# Patient Record
Sex: FEMALE | Race: WHITE | NOT HISPANIC OR LATINO | Employment: STUDENT | URBAN - METROPOLITAN AREA
[De-identification: names, ages, dates, MRNs, and addresses within clinical notes are randomized per-mention and may not be internally consistent; named-entity substitution may affect disease eponyms.]

---
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
## 2022-07-07 ENCOUNTER — TELEPHONE (OUTPATIENT)
Dept: PEDIATRICS | Age: 22
End: 2022-07-07

## 2022-07-19 ENCOUNTER — OFFICE VISIT (OUTPATIENT)
Dept: PEDIATRICS | Age: 22
End: 2022-07-19

## 2022-07-19 VITALS
OXYGEN SATURATION: 99 % | DIASTOLIC BLOOD PRESSURE: 71 MMHG | HEIGHT: 65 IN | BODY MASS INDEX: 19.76 KG/M2 | TEMPERATURE: 98.3 F | WEIGHT: 118.6 LBS | HEART RATE: 88 BPM | SYSTOLIC BLOOD PRESSURE: 108 MMHG

## 2022-07-19 DIAGNOSIS — Z00.129 WELL ADOLESCENT VISIT WITHOUT ABNORMAL FINDINGS: Primary | ICD-10-CM

## 2022-07-19 LAB
CHOLEST SERPL-MCNC: 128 MG/DL
GLUCOSE SERPL-MCNC: 111 MG/DL
HDLC SERPL-MCNC: 42 MG/DL
LDLC SERPL CALC-MCNC: 72 MG/DL (ref ?–109)
NON HDL CHOL: 86
TRIGLYCERIDES: 71

## 2022-07-19 PROCEDURE — 82465 ASSAY BLD/SERUM CHOLESTEROL: CPT | Performed by: PEDIATRICS

## 2022-07-19 PROCEDURE — 90471 IMMUNIZATION ADMIN: CPT | Performed by: PEDIATRICS

## 2022-07-19 PROCEDURE — 96127 BRIEF EMOTIONAL/BEHAV ASSMT: CPT | Performed by: PEDIATRICS

## 2022-07-19 PROCEDURE — 99395 PREV VISIT EST AGE 18-39: CPT | Performed by: PEDIATRICS

## 2022-07-19 PROCEDURE — 90715 TDAP VACCINE 7 YRS/> IM: CPT | Performed by: PEDIATRICS

## 2022-07-19 ASSESSMENT — PATIENT HEALTH QUESTIONNAIRE - PHQ9
1. LITTLE INTEREST OR PLEASURE IN DOING THINGS: NOT AT ALL
SUM OF ALL RESPONSES TO PHQ9 QUESTIONS 1 AND 2: 0
CLINICAL INTERPRETATION OF PHQ2 SCORE: NO FURTHER SCREENING NEEDED
2. FEELING DOWN, DEPRESSED OR HOPELESS: NOT AT ALL
SUM OF ALL RESPONSES TO PHQ9 QUESTIONS 1 AND 2: 0

## 2023-04-18 ASSESSMENT — PATIENT HEALTH QUESTIONNAIRE - PHQ9
SUM OF ALL RESPONSES TO PHQ9 QUESTIONS 1 AND 2: 0
SUM OF ALL RESPONSES TO PHQ9 QUESTIONS 1 AND 2: 0
CLINICAL INTERPRETATION OF PHQ2 SCORE: NO FURTHER SCREENING NEEDED
2. FEELING DOWN, DEPRESSED OR HOPELESS: NOT AT ALL
1. LITTLE INTEREST OR PLEASURE IN DOING THINGS: NOT AT ALL

## 2023-08-21 ENCOUNTER — APPOINTMENT (OUTPATIENT)
Dept: OTHER | Facility: HOSPITAL | Age: 23
End: 2023-08-21
Attending: PREVENTIVE MEDICINE

## 2023-08-23 ENCOUNTER — APPOINTMENT (OUTPATIENT)
Dept: OTHER | Facility: HOSPITAL | Age: 23
End: 2023-08-23
Attending: PREVENTIVE MEDICINE

## 2025-04-08 ENCOUNTER — APPOINTMENT (OUTPATIENT)
Dept: CT IMAGING | Facility: HOSPITAL | Age: 25
End: 2025-04-08
Attending: EMERGENCY MEDICINE
Payer: COMMERCIAL

## 2025-04-08 ENCOUNTER — APPOINTMENT (OUTPATIENT)
Dept: CT IMAGING | Facility: HOSPITAL | Age: 25
End: 2025-04-08
Attending: STUDENT IN AN ORGANIZED HEALTH CARE EDUCATION/TRAINING PROGRAM
Payer: COMMERCIAL

## 2025-04-08 ENCOUNTER — HOSPITAL ENCOUNTER (INPATIENT)
Facility: HOSPITAL | Age: 25
LOS: 5 days | Discharge: HOME | End: 2025-04-13
Attending: EMERGENCY MEDICINE | Admitting: STUDENT IN AN ORGANIZED HEALTH CARE EDUCATION/TRAINING PROGRAM
Payer: COMMERCIAL

## 2025-04-08 ENCOUNTER — ANESTHESIA (OUTPATIENT)
Dept: SURGERY | Facility: HOSPITAL | Age: 25
End: 2025-04-08
Payer: COMMERCIAL

## 2025-04-08 ENCOUNTER — APPOINTMENT (OUTPATIENT)
Dept: CT IMAGING | Facility: HOSPITAL | Age: 25
End: 2025-04-08
Attending: NURSE PRACTITIONER
Payer: COMMERCIAL

## 2025-04-08 ENCOUNTER — ANESTHESIA EVENT (OUTPATIENT)
Dept: SURGERY | Facility: HOSPITAL | Age: 25
End: 2025-04-08
Payer: COMMERCIAL

## 2025-04-08 ENCOUNTER — APPOINTMENT (OUTPATIENT)
Dept: ULTRASOUND IMAGING | Facility: HOSPITAL | Age: 25
End: 2025-04-08
Attending: NEUROLOGICAL SURGERY
Payer: COMMERCIAL

## 2025-04-08 DIAGNOSIS — S06.4XAA EPIDURAL HEMATOMA (HCC): ICD-10-CM

## 2025-04-08 DIAGNOSIS — S06.5XAA ACUTE SUBDURAL HEMATOMA (HCC): Primary | ICD-10-CM

## 2025-04-08 DIAGNOSIS — S02.91XA CLOSED DEPRESSED FRACTURE OF SKULL, INITIAL ENCOUNTER (HCC): ICD-10-CM

## 2025-04-08 DIAGNOSIS — R41.82 ALTERED MENTAL STATUS, UNSPECIFIED ALTERED MENTAL STATUS TYPE: ICD-10-CM

## 2025-04-08 LAB
ALBUMIN SERPL-MCNC: 4.6 G/DL (ref 3.2–4.8)
ALBUMIN/GLOB SERPL: 1.8 {RATIO} (ref 1–2)
ALP LIVER SERPL-CCNC: 59 U/L
ALT SERPL-CCNC: 10 U/L
ANION GAP SERPL CALC-SCNC: 9 MMOL/L (ref 0–18)
ANION GAP SERPL CALC-SCNC: 9 MMOL/L (ref 0–18)
ANTIBODY SCREEN: NEGATIVE
APTT PPP: 21.9 SECONDS (ref 23–36)
AST SERPL-CCNC: 24 U/L (ref ?–34)
BASOPHILS # BLD AUTO: 0.04 X10(3) UL (ref 0–0.2)
BASOPHILS # BLD AUTO: 0.09 X10(3) UL (ref 0–0.2)
BASOPHILS NFR BLD AUTO: 0.2 %
BASOPHILS NFR BLD AUTO: 0.7 %
BILIRUB SERPL-MCNC: 0.3 MG/DL (ref 0.3–1.2)
BUN BLD-MCNC: 13 MG/DL (ref 9–23)
BUN BLD-MCNC: 15 MG/DL (ref 9–23)
CALCIUM BLD-MCNC: 8.2 MG/DL (ref 8.7–10.6)
CALCIUM BLD-MCNC: 9.5 MG/DL (ref 8.7–10.6)
CHLORIDE SERPL-SCNC: 106 MMOL/L (ref 98–112)
CHLORIDE SERPL-SCNC: 109 MMOL/L (ref 98–112)
CO2 SERPL-SCNC: 21 MMOL/L (ref 21–32)
CO2 SERPL-SCNC: 25 MMOL/L (ref 21–32)
CREAT BLD-MCNC: 0.84 MG/DL
CREAT BLD-MCNC: 1.04 MG/DL
EGFRCR SERPLBLD CKD-EPI 2021: 101 ML/MIN/1.73M2 (ref 60–?)
EGFRCR SERPLBLD CKD-EPI 2021: 78 ML/MIN/1.73M2 (ref 60–?)
EOSINOPHIL # BLD AUTO: 0 X10(3) UL (ref 0–0.7)
EOSINOPHIL # BLD AUTO: 0.43 X10(3) UL (ref 0–0.7)
EOSINOPHIL NFR BLD AUTO: 0 %
EOSINOPHIL NFR BLD AUTO: 3.4 %
ERYTHROCYTE [DISTWIDTH] IN BLOOD BY AUTOMATED COUNT: 11 %
ERYTHROCYTE [DISTWIDTH] IN BLOOD BY AUTOMATED COUNT: 11.2 %
GLOBULIN PLAS-MCNC: 2.5 G/DL (ref 2–3.5)
GLUCOSE BLD-MCNC: 132 MG/DL (ref 70–99)
GLUCOSE BLD-MCNC: 142 MG/DL (ref 70–99)
GLUCOSE BLD-MCNC: 150 MG/DL (ref 70–99)
HCT VFR BLD AUTO: 32.2 %
HCT VFR BLD AUTO: 37 %
HGB BLD-MCNC: 11.2 G/DL
HGB BLD-MCNC: 12.6 G/DL
IMM GRANULOCYTES # BLD AUTO: 0.07 X10(3) UL (ref 0–1)
IMM GRANULOCYTES # BLD AUTO: 0.15 X10(3) UL (ref 0–1)
IMM GRANULOCYTES NFR BLD: 0.6 %
IMM GRANULOCYTES NFR BLD: 0.7 %
INR BLD: 1.05 (ref 0.8–1.2)
LYMPHOCYTES # BLD AUTO: 1.02 X10(3) UL (ref 1–4)
LYMPHOCYTES # BLD AUTO: 3.97 X10(3) UL (ref 1–4)
LYMPHOCYTES NFR BLD AUTO: 31.5 %
LYMPHOCYTES NFR BLD AUTO: 4.5 %
MCH RBC QN AUTO: 32 PG (ref 26–34)
MCH RBC QN AUTO: 32.2 PG (ref 26–34)
MCHC RBC AUTO-ENTMCNC: 34.1 G/DL (ref 31–37)
MCHC RBC AUTO-ENTMCNC: 34.8 G/DL (ref 31–37)
MCV RBC AUTO: 92.5 FL
MCV RBC AUTO: 93.9 FL
MONOCYTES # BLD AUTO: 0.59 X10(3) UL (ref 0.1–1)
MONOCYTES # BLD AUTO: 0.76 X10(3) UL (ref 0.1–1)
MONOCYTES NFR BLD AUTO: 2.6 %
MONOCYTES NFR BLD AUTO: 6 %
NEUTROPHILS # BLD AUTO: 20.88 X10 (3) UL (ref 1.5–7.7)
NEUTROPHILS # BLD AUTO: 20.88 X10(3) UL (ref 1.5–7.7)
NEUTROPHILS # BLD AUTO: 7.3 X10 (3) UL (ref 1.5–7.7)
NEUTROPHILS # BLD AUTO: 7.3 X10(3) UL (ref 1.5–7.7)
NEUTROPHILS NFR BLD AUTO: 57.8 %
NEUTROPHILS NFR BLD AUTO: 92 %
OSMOLALITY SERPL CALC.SUM OF ELEC: 291 MOSM/KG (ref 275–295)
OSMOLALITY SERPL CALC.SUM OF ELEC: 293 MOSM/KG (ref 275–295)
PLATELET # BLD AUTO: 209 10(3)UL (ref 150–450)
PLATELET # BLD AUTO: 259 10(3)UL (ref 150–450)
POTASSIUM SERPL-SCNC: 3.3 MMOL/L (ref 3.5–5.1)
POTASSIUM SERPL-SCNC: 4.3 MMOL/L (ref 3.5–5.1)
PROT SERPL-MCNC: 7.1 G/DL (ref 5.7–8.2)
PROTHROMBIN TIME: 13.8 SECONDS (ref 11.6–14.8)
RBC # BLD AUTO: 3.48 X10(6)UL
RBC # BLD AUTO: 3.94 X10(6)UL
RH BLOOD TYPE: NEGATIVE
SODIUM SERPL-SCNC: 139 MMOL/L (ref 136–145)
SODIUM SERPL-SCNC: 140 MMOL/L (ref 136–145)
WBC # BLD AUTO: 12.6 X10(3) UL (ref 4–11)
WBC # BLD AUTO: 22.7 X10(3) UL (ref 4–11)

## 2025-04-08 PROCEDURE — 99223 1ST HOSP IP/OBS HIGH 75: CPT | Performed by: HOSPITALIST

## 2025-04-08 PROCEDURE — 70496 CT ANGIOGRAPHY HEAD: CPT | Performed by: NURSE PRACTITIONER

## 2025-04-08 PROCEDURE — 70486 CT MAXILLOFACIAL W/O DYE: CPT | Performed by: STUDENT IN AN ORGANIZED HEALTH CARE EDUCATION/TRAINING PROGRAM

## 2025-04-08 PROCEDURE — 99291 CRITICAL CARE FIRST HOUR: CPT | Performed by: NURSE PRACTITIONER

## 2025-04-08 PROCEDURE — 70498 CT ANGIOGRAPHY NECK: CPT | Performed by: NURSE PRACTITIONER

## 2025-04-08 PROCEDURE — 00C30ZZ EXTIRPATION OF MATTER FROM INTRACRANIAL EPIDURAL SPACE, OPEN APPROACH: ICD-10-PCS | Performed by: NEUROLOGICAL SURGERY

## 2025-04-08 PROCEDURE — 8E09XBZ COMPUTER ASSISTED PROCEDURE OF HEAD AND NECK REGION: ICD-10-PCS | Performed by: NEUROLOGICAL SURGERY

## 2025-04-08 PROCEDURE — 70450 CT HEAD/BRAIN W/O DYE: CPT | Performed by: EMERGENCY MEDICINE

## 2025-04-08 PROCEDURE — 72125 CT NECK SPINE W/O DYE: CPT | Performed by: EMERGENCY MEDICINE

## 2025-04-08 PROCEDURE — 76998 US GUIDE INTRAOP: CPT | Performed by: NEUROLOGICAL SURGERY

## 2025-04-08 PROCEDURE — 0NS004Z REPOSITION SKULL WITH INTERNAL FIXATION DEVICE, OPEN APPROACH: ICD-10-PCS | Performed by: NEUROLOGICAL SURGERY

## 2025-04-08 DEVICE — STRAIGHT PLATE, 12MM BAR, WITH TAB
Type: IMPLANTABLE DEVICE | Site: HEAD | Status: FUNCTIONAL
Brand: UNIVERSAL NEURO 3

## 2025-04-08 DEVICE — STRAIGHT PLATE
Type: IMPLANTABLE DEVICE | Site: HEAD | Status: FUNCTIONAL
Brand: UNIVERSAL NEURO 3

## 2025-04-08 DEVICE — BURR HOLE COVER, WITH TAB, 10MM
Type: IMPLANTABLE DEVICE | Site: HEAD | Status: FUNCTIONAL
Brand: UNIVERSAL NEURO 3

## 2025-04-08 RX ORDER — SENNOSIDES 8.6 MG
17.2 TABLET ORAL NIGHTLY PRN
Status: DISCONTINUED | OUTPATIENT
Start: 2025-04-08 | End: 2025-04-13

## 2025-04-08 RX ORDER — ACETAMINOPHEN 650 MG/1
650 SUPPOSITORY RECTAL EVERY 4 HOURS PRN
Status: DISCONTINUED | OUTPATIENT
Start: 2025-04-08 | End: 2025-04-13

## 2025-04-08 RX ORDER — POLYETHYLENE GLYCOL 3350 17 G/17G
17 POWDER, FOR SOLUTION ORAL DAILY PRN
Status: DISCONTINUED | OUTPATIENT
Start: 2025-04-08 | End: 2025-04-13

## 2025-04-08 RX ORDER — SODIUM CHLORIDE 9 MG/ML
INJECTION, SOLUTION INTRAVENOUS CONTINUOUS
Status: DISCONTINUED | OUTPATIENT
Start: 2025-04-08 | End: 2025-04-08

## 2025-04-08 RX ORDER — OXYCODONE HYDROCHLORIDE 5 MG/1
5 TABLET ORAL EVERY 4 HOURS PRN
Status: DISCONTINUED | OUTPATIENT
Start: 2025-04-08 | End: 2025-04-13

## 2025-04-08 RX ORDER — DEXAMETHASONE SODIUM PHOSPHATE 4 MG/ML
VIAL (ML) INJECTION AS NEEDED
Status: DISCONTINUED | OUTPATIENT
Start: 2025-04-08 | End: 2025-04-08 | Stop reason: SURG

## 2025-04-08 RX ORDER — LEVETIRACETAM 500 MG/5ML
500 INJECTION, SOLUTION, CONCENTRATE INTRAVENOUS ONCE
Status: COMPLETED | OUTPATIENT
Start: 2025-04-08 | End: 2025-04-08

## 2025-04-08 RX ORDER — PROCHLORPERAZINE EDISYLATE 5 MG/ML
5 INJECTION INTRAMUSCULAR; INTRAVENOUS EVERY 8 HOURS PRN
Status: DISCONTINUED | OUTPATIENT
Start: 2025-04-08 | End: 2025-04-08

## 2025-04-08 RX ORDER — HYDROCODONE BITARTRATE AND ACETAMINOPHEN 5; 325 MG/1; MG/1
1 TABLET ORAL ONCE AS NEEDED
Status: DISCONTINUED | OUTPATIENT
Start: 2025-04-08 | End: 2025-04-08

## 2025-04-08 RX ORDER — ROCURONIUM BROMIDE 10 MG/ML
INJECTION, SOLUTION INTRAVENOUS AS NEEDED
Status: DISCONTINUED | OUTPATIENT
Start: 2025-04-08 | End: 2025-04-08 | Stop reason: SURG

## 2025-04-08 RX ORDER — BISACODYL 10 MG
10 SUPPOSITORY, RECTAL RECTAL
Status: DISCONTINUED | OUTPATIENT
Start: 2025-04-08 | End: 2025-04-13

## 2025-04-08 RX ORDER — ACETAMINOPHEN 500 MG
1000 TABLET ORAL ONCE AS NEEDED
Status: DISCONTINUED | OUTPATIENT
Start: 2025-04-08 | End: 2025-04-08

## 2025-04-08 RX ORDER — LABETALOL HYDROCHLORIDE 5 MG/ML
10 INJECTION, SOLUTION INTRAVENOUS EVERY 10 MIN PRN
Status: DISCONTINUED | OUTPATIENT
Start: 2025-04-08 | End: 2025-04-13

## 2025-04-08 RX ORDER — HYDRALAZINE HYDROCHLORIDE 20 MG/ML
10 INJECTION INTRAMUSCULAR; INTRAVENOUS EVERY 2 HOUR PRN
Status: DISCONTINUED | OUTPATIENT
Start: 2025-04-08 | End: 2025-04-13

## 2025-04-08 RX ORDER — SODIUM CHLORIDE 9 MG/ML
INJECTION, SOLUTION INTRAVENOUS CONTINUOUS
Status: DISCONTINUED | OUTPATIENT
Start: 2025-04-08 | End: 2025-04-13

## 2025-04-08 RX ORDER — ONDANSETRON 2 MG/ML
4 INJECTION INTRAMUSCULAR; INTRAVENOUS EVERY 6 HOURS PRN
Status: DISCONTINUED | OUTPATIENT
Start: 2025-04-08 | End: 2025-04-13

## 2025-04-08 RX ORDER — SODIUM CHLORIDE 9 MG/ML
INJECTION, SOLUTION INTRAVENOUS CONTINUOUS PRN
Status: DISCONTINUED | OUTPATIENT
Start: 2025-04-08 | End: 2025-04-08 | Stop reason: SURG

## 2025-04-08 RX ORDER — LEVETIRACETAM 500 MG/5ML
500 INJECTION, SOLUTION, CONCENTRATE INTRAVENOUS EVERY 12 HOURS
Status: DISCONTINUED | OUTPATIENT
Start: 2025-04-08 | End: 2025-04-08

## 2025-04-08 RX ORDER — ONDANSETRON 2 MG/ML
INJECTION INTRAMUSCULAR; INTRAVENOUS
Status: COMPLETED
Start: 2025-04-08 | End: 2025-04-08

## 2025-04-08 RX ORDER — ACETAMINOPHEN 325 MG/1
650 TABLET ORAL EVERY 4 HOURS PRN
Status: DISCONTINUED | OUTPATIENT
Start: 2025-04-08 | End: 2025-04-13

## 2025-04-08 RX ORDER — SODIUM PHOSPHATE, DIBASIC AND SODIUM PHOSPHATE, MONOBASIC 7; 19 G/230ML; G/230ML
1 ENEMA RECTAL ONCE AS NEEDED
Status: DISCONTINUED | OUTPATIENT
Start: 2025-04-08 | End: 2025-04-13

## 2025-04-08 RX ORDER — MEPERIDINE HYDROCHLORIDE 25 MG/ML
12.5 INJECTION INTRAMUSCULAR; INTRAVENOUS; SUBCUTANEOUS AS NEEDED
Status: DISCONTINUED | OUTPATIENT
Start: 2025-04-08 | End: 2025-04-09

## 2025-04-08 RX ORDER — ONDANSETRON 2 MG/ML
4 INJECTION INTRAMUSCULAR; INTRAVENOUS EVERY 6 HOURS PRN
Status: DISCONTINUED | OUTPATIENT
Start: 2025-04-08 | End: 2025-04-08

## 2025-04-08 RX ORDER — LIDOCAINE HYDROCHLORIDE AND EPINEPHRINE 10; 10 MG/ML; UG/ML
INJECTION, SOLUTION INFILTRATION; PERINEURAL AS NEEDED
Status: DISCONTINUED | OUTPATIENT
Start: 2025-04-08 | End: 2025-04-08 | Stop reason: HOSPADM

## 2025-04-08 RX ORDER — HYDROMORPHONE HYDROCHLORIDE 1 MG/ML
0.6 INJECTION, SOLUTION INTRAMUSCULAR; INTRAVENOUS; SUBCUTANEOUS EVERY 5 MIN PRN
Status: DISCONTINUED | OUTPATIENT
Start: 2025-04-08 | End: 2025-04-09

## 2025-04-08 RX ORDER — HYDROMORPHONE HYDROCHLORIDE 1 MG/ML
0.4 INJECTION, SOLUTION INTRAMUSCULAR; INTRAVENOUS; SUBCUTANEOUS EVERY 5 MIN PRN
Status: DISCONTINUED | OUTPATIENT
Start: 2025-04-08 | End: 2025-04-09

## 2025-04-08 RX ORDER — SODIUM CHLORIDE, SODIUM LACTATE, POTASSIUM CHLORIDE, CALCIUM CHLORIDE 600; 310; 30; 20 MG/100ML; MG/100ML; MG/100ML; MG/100ML
INJECTION, SOLUTION INTRAVENOUS CONTINUOUS
Status: DISCONTINUED | OUTPATIENT
Start: 2025-04-08 | End: 2025-04-08

## 2025-04-08 RX ORDER — LIDOCAINE HYDROCHLORIDE 10 MG/ML
INJECTION, SOLUTION EPIDURAL; INFILTRATION; INTRACAUDAL; PERINEURAL AS NEEDED
Status: DISCONTINUED | OUTPATIENT
Start: 2025-04-08 | End: 2025-04-08 | Stop reason: SURG

## 2025-04-08 RX ORDER — LABETALOL HYDROCHLORIDE 5 MG/ML
5 INJECTION, SOLUTION INTRAVENOUS EVERY 5 MIN PRN
Status: DISCONTINUED | OUTPATIENT
Start: 2025-04-08 | End: 2025-04-08

## 2025-04-08 RX ORDER — HYDROMORPHONE HYDROCHLORIDE 1 MG/ML
0.2 INJECTION, SOLUTION INTRAMUSCULAR; INTRAVENOUS; SUBCUTANEOUS EVERY 5 MIN PRN
Status: DISCONTINUED | OUTPATIENT
Start: 2025-04-08 | End: 2025-04-09

## 2025-04-08 RX ORDER — PANTOPRAZOLE SODIUM 40 MG/1
40 TABLET, DELAYED RELEASE ORAL
Status: DISCONTINUED | OUTPATIENT
Start: 2025-04-09 | End: 2025-04-13

## 2025-04-08 RX ORDER — HYDROCODONE BITARTRATE AND ACETAMINOPHEN 5; 325 MG/1; MG/1
2 TABLET ORAL ONCE AS NEEDED
Status: DISCONTINUED | OUTPATIENT
Start: 2025-04-08 | End: 2025-04-08

## 2025-04-08 RX ORDER — ESMOLOL HYDROCHLORIDE 10 MG/ML
INJECTION INTRAVENOUS AS NEEDED
Status: DISCONTINUED | OUTPATIENT
Start: 2025-04-08 | End: 2025-04-08 | Stop reason: SURG

## 2025-04-08 RX ORDER — NORGESTIMATE AND ETHINYL ESTRADIOL 7DAYSX3 28
KIT ORAL
COMMUNITY

## 2025-04-08 RX ORDER — NALOXONE HYDROCHLORIDE 0.4 MG/ML
0.08 INJECTION, SOLUTION INTRAMUSCULAR; INTRAVENOUS; SUBCUTANEOUS AS NEEDED
Status: DISCONTINUED | OUTPATIENT
Start: 2025-04-08 | End: 2025-04-09

## 2025-04-08 RX ORDER — ONDANSETRON 2 MG/ML
4 INJECTION INTRAMUSCULAR; INTRAVENOUS ONCE
Status: COMPLETED | OUTPATIENT
Start: 2025-04-08 | End: 2025-04-08

## 2025-04-08 RX ORDER — METOCLOPRAMIDE HYDROCHLORIDE 5 MG/ML
10 INJECTION INTRAMUSCULAR; INTRAVENOUS EVERY 8 HOURS PRN
Status: DISCONTINUED | OUTPATIENT
Start: 2025-04-08 | End: 2025-04-13

## 2025-04-08 RX ORDER — LEVETIRACETAM 500 MG/5ML
500 INJECTION, SOLUTION, CONCENTRATE INTRAVENOUS EVERY 12 HOURS
Status: DISCONTINUED | OUTPATIENT
Start: 2025-04-09 | End: 2025-04-09

## 2025-04-08 RX ORDER — CEFAZOLIN SODIUM 1 G/3ML
INJECTION, POWDER, FOR SOLUTION INTRAMUSCULAR; INTRAVENOUS AS NEEDED
Status: DISCONTINUED | OUTPATIENT
Start: 2025-04-08 | End: 2025-04-08 | Stop reason: SURG

## 2025-04-08 RX ADMIN — LEVETIRACETAM 1000 MG: 500 INJECTION, SOLUTION, CONCENTRATE INTRAVENOUS at 18:51:00

## 2025-04-08 RX ADMIN — ROCURONIUM BROMIDE 100 MG: 10 INJECTION, SOLUTION INTRAVENOUS at 18:25:00

## 2025-04-08 RX ADMIN — SODIUM CHLORIDE: 9 INJECTION, SOLUTION INTRAVENOUS at 18:40:00

## 2025-04-08 RX ADMIN — SODIUM CHLORIDE: 9 INJECTION, SOLUTION INTRAVENOUS at 21:01:00

## 2025-04-08 RX ADMIN — DEXAMETHASONE SODIUM PHOSPHATE 10 MG: 4 MG/ML VIAL (ML) INJECTION at 18:54:00

## 2025-04-08 RX ADMIN — CEFAZOLIN SODIUM 2 G: 1 INJECTION, POWDER, FOR SOLUTION INTRAMUSCULAR; INTRAVENOUS at 18:52:00

## 2025-04-08 RX ADMIN — SODIUM CHLORIDE: 9 INJECTION, SOLUTION INTRAVENOUS at 18:16:00

## 2025-04-08 RX ADMIN — ESMOLOL HYDROCHLORIDE 50 MG: 10 INJECTION INTRAVENOUS at 18:21:00

## 2025-04-08 RX ADMIN — LIDOCAINE HYDROCHLORIDE 25 MG: 10 INJECTION, SOLUTION EPIDURAL; INFILTRATION; INTRACAUDAL; PERINEURAL at 18:25:00

## 2025-04-08 NOTE — ED INITIAL ASSESSMENT (HPI)
Pt was playing softball, collided with another player and was struck in the head with a cleet.  Unknown LOC.  Per EMS pt A&Ox2, vomited several times PTA.  C-collar prior to admission by EMS.

## 2025-04-08 NOTE — ED QUICK NOTES
Patient to OR with RT, two Rns and ER tech, along with family. Keppra given to OR anesthesiology.

## 2025-04-08 NOTE — H&P
[facility]     Adina Jefferson Patient Status:  Emergency    10/2/2000 MRN [id number]   Location [facility] EMERGENCY DEPARTMENT Attending Oren Bedoya MD   Hosp Day # 0 PCP No primary care provider on file.     Chief Complaint: Head trauma    Subjective:    History of Present Illness:     Adina Jefferson is a 24 year old female with past medical history of anxiety and depression presents emergency room after playing a game of softball where the patient collided with another player and was struck in the head with a cleat.  Patient was arousable slow to respond.  She does not know who her parents are aware she is at or what happened.  She does not know her last name and does not understand simple objects.  She does know her first name and her date of birth.  No fevers, chills, diarrhea.  No loss of bladder or bowel function.  No back pain, abdominal pain, chest pain.    History/Other:    Past Medical History:  Past Medical History:    Anxiety     Past Surgical History:   History reviewed. No pertinent surgical history.   Family History:   No family history on file.  Social History:    reports that she has never smoked. She has never used smokeless tobacco.     Allergies: Allergies[1]    Medications:  Medications Ordered Prior to Encounter[2]    Review of Systems:   A comprehensive review of systems was completed.    Pertinent positives and negatives noted in the HPI.    Objective:   Physical Exam:    BP (!) 136/97   Pulse 93   Temp 98 °F (36.7 °C) (Temporal)   Resp 16   Ht 5' 7\" (1.702 m)   Wt 130 lb (59 kg)   LMP  (LMP Unknown)   SpO2 100%   BMI 20.36 kg/m²   General: No acute distress, Alert  Respiratory: No rhonchi, no wheezes  Cardiovascular: S1, S2. Regular rate and rhythm  Abdomen: Soft, Non-tender, non-distended, positive bowel sounds  Neuro: No new focal deficits  Extremities: No edema      Results:    Labs:      Labs Last 24 Hours:    No results for input(s): \"RBC\",  \"HGB\", \"HCT\", \"MCV\", \"MCH\", \"MCHC\", \"RDW\", \"NEPRELIM\", \"WBC\", \"PLT\" in the last 168 hours.    No results for input(s): \"GLU\", \"BUN\", \"CREATSERUM\", \"GFRAA\", \"GFRNAA\", \"EGFRCR\", \"CA\", \"ALB\", \"NA\", \"K\", \"CL\", \"CO2\", \"ALKPHO\", \"AST\", \"ALT\", \"BILT\", \"TP\" in the last 168 hours.    eGFR cannot be calculated (No successful lab value found.).    Lab Results   Component Value Date    INR 1.05 04/08/2025       No results for input(s): \"TROP\", \"TROPHS\", \"CK\" in the last 168 hours.    No results for input(s): \"TROP\", \"PBNP\" in the last 168 hours.    No results for input(s): \"PCT\" in the last 168 hours.    Imaging: Imaging data reviewed in Epic.    Assessment & Plan:      # Multiple skull fracture including left temporal and bilateral greater sphenoid wings, left middle cranial fossa, sphemoid sinuses, sella, and carotid canals, bilateral temporomandibular joints.  - neurosurgery and NCC on consult.    # Left lateral cerebral hemorrhage  - Will admit to NICU  - Neurosurgery on consult  - NCC on consult  - Liely going to the OR tonight with worsening  lethergy. Pt to go for a left craniotomy for evacuation of epidural hematoma.      Plan of care discussed with ER physician.    Nick Wood, DO    Supplementary Documentation:     The 21st Century Cures Act makes medical notes like these available to patients in the interest of transparency. Please be advised this is a medical document. Medical documents are intended to carry relevant information, facts as evident, and the clinical opinion of the practitioner. The medical note is intended as peer to peer communication and may appear blunt or direct. It is written in medical language and may contain abbreviations or verbiage that are unfamiliar.                                     [1] No Known Allergies  [2]   No current facility-administered medications on file prior to encounter.     Current Outpatient Medications on File Prior to Encounter   Medication Sig Dispense  Refill    sertraline 50 MG Oral Tab Take 1.5 tablets (75 mg total) by mouth daily.      Norgestim-Eth Estrad Triphasic 0.18/0.215/0.25 MG-35 MCG Oral Tab 1 tablet Orally Once a day for 84 days

## 2025-04-08 NOTE — SPIRITUAL CARE NOTE
Spiritual Care Visit Note    Patient Name: Adina Jefferson Date of Spiritual Care Visit: 25   : 10/2/2000 Primary Dx: Acute subdural hematoma (HCC)       Referred By: Referral From: Nurse    Spiritual Care Taxonomy:    Intended Effects: Convey a calming presence    Methods: Offer support    Interventions: Acknowledge current situation    Visit Type/Summary:     - Spiritual Care: Responded to a request via the on call phone Offered empathic listening and emotional support. However family opted to be by themselves at this time. Respected their space/time. Provided information regarding how to contact Spiritual Care and handed over a Spiritual Care contact information/resources.  remains available as needed for follow up.    Spiritual Care support can be requested via an Epic consult.   For urgent/immediate needs, please contact the On Call  at: Mendez: ext [phone]    Rev. Mendez Huffman M.Div., M.T.S.,   Certified Grief Counseling Specialist  Advanced Practice Board Certified

## 2025-04-08 NOTE — ED QUICK NOTES
Patient vomiting at bedside. Zofran ordered and given. Patient O2 at 88% on RA, patient placed on 2L NC, SPO2 at 100%. Patient is arousable, slow to respond. Doesn't know who her parents are, where she is at or what happened, doesn't understand simple objects, doesn't know her last name. Does know her name and . C-Collar in place. Will continue to monitor.

## 2025-04-08 NOTE — ANESTHESIA PREPROCEDURE EVALUATION
PRE-OP EVALUATION    Patient Name: Adina Jefferson    Admit Diagnosis: No admission diagnoses are documented for this encounter.    Pre-op Diagnosis: Epidural hematoma (HCC) [S06.4XAA]    LEFT CRANIOTOMY FOR EVACUATION OF EPIDURAL HEMATOMA    Anesthesia Procedure: LEFT CRANIOTOMY FOR EVACUATION OF EPIDURAL HEMATOMA (Left)    Surgeons and Role:     * Xiang Herrera MD - Primary    Pre-op vitals reviewed.  Temp: 98 °F (36.7 °C)  Pulse: 93  Resp: 16  BP: 136/97  SpO2: 100 %  Body mass index is 20.36 kg/m².    Current medications reviewed.  Hospital Medications:   [COMPLETED] ondansetron (Zofran) 4 MG/2ML injection 4 mg  4 mg Intravenous Once       Outpatient Medications:   Prescriptions Prior to Admission[1]    Allergies: Patient has no known allergies.      Anesthesia Evaluation  Patient Active Problem List   Diagnosis    Acute subdural hematoma (HCC)        Past Medical History:    Anxiety          History reviewed. No pertinent surgical history.  Social History     Socioeconomic History    Marital status: Single   Tobacco Use    Smoking status: Never    Smokeless tobacco: Never     History   Drug Use Not on file     Available pre-op labs reviewed.               Airway  Comment: Multiple facial fractures on CT, face puffy on exam, C-collar in place, pt obtunded and intermittently following directions          Neck ROM: limited Cardiovascular    Cardiovascular exam normal.  Rhythm: regular  Rate: normal     Dental             Pulmonary    Pulmonary exam normal.  Breath sounds clear to auscultation bilaterally.               Other findings              ASA: 5 and emergent  Plan: general   Patient does not meet NPO guidelines.    Post-procedure pain management plan discussed with surgeon and patient.    Comment:  I explained intrinsic risks of general anesthesia, including nausea, dental damage, sore throat, mouth injury,and hoarseness from airway management.  All questions were answered and understanding was  demonstrated of risks.  Informed permission was obtained to proceed as documented in the signed consent form.        Consent obtained from father given obtunded patient.  Plan/risks discussed with: patient, father and mother  Use of blood product(s) discussed with: patient, father and mother    Consented to blood products.          Present on Admission:  **None**             [1] (Not in a hospital admission)

## 2025-04-08 NOTE — ED PROVIDER NOTES
Patient Seen in: [facility] [place] Emergency Department      History     Chief Complaint   Patient presents with    Trauma 1 & 2    Head Neck Injury     Stated Complaint:     Subjective:   HPI      24-year-old female brought in by EMS after a head injury while playing softball at the local [employer].  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  It is unknown if she lost consciousness at the scene but she has not been acting like her normal self.  Injury occurred just prior to arrival.  Patient reportedly vomited at the scene.  Patient does not remember the incident and is currently unable to provide any significant history.    Objective:     Past Medical History:    Anxiety              History reviewed. No pertinent surgical history.             Social History     Socioeconomic History    Marital status: Single   Tobacco Use    Smoking status: Never    Smokeless tobacco: Never                  Physical Exam     ED Triage Vitals [04/08/25 1616]   /80   Pulse 70   Resp 10   Temp 98 °F (36.7 °C)   Temp src Temporal   SpO2 100 %   O2 Device None (Room air)       Current Vitals:   Vital Signs  BP: 150/88  Pulse: 111  Resp: 17  Temp: 98 °F (36.7 °C)  Temp src: Temporal  MAP (mmHg): (!) 109    Oxygen Therapy  SpO2: 95 %  O2 Device: Nasal cannula  O2 Flow Rate (L/min): 2 L/min        Physical Exam  General:  Vitals as listed.    HEENT: Soft contusion to the left temporal area.  No laceration.  No Vazquez sign.  No hemotympanums.  There is some dried blood in bilateral nasal passages.  No tenderness on palpation of the bridge of the nose.  No obvious nose deformity or septal hematoma.  Neck: C-collar in place.  Denying bony tenderness on palpation  Lungs: good air exchange and clear   Heart: regular rate rhythm and no murmur   Abdomen: Soft and nontender.  No peritoneal signs   Extremities: No obvious traumatic injury to the  extremities.  No edema  Neuro: Awake and following some commands.  Responding to some questioning but reliability of answers is uncertain.  She is slow to answer and certainly appears altered  Skin: no rashes or nodules    ED Course     Labs Reviewed   PTT, ACTIVATED - Abnormal; Notable for the following components:       Result Value    PTT 21.9 (*)     All other components within normal limits   PROTHROMBIN TIME (PT) - Normal   CBC WITH DIFFERENTIAL WITH PLATELET   COMP METABOLIC PANEL (14)   CBC WITH DIFFERENTIAL WITH PLATELET   TYPE AND SCREEN    Narrative:     The following orders were created for panel order Type and screen.  Procedure                               Abnormality         Status                     ---------                               -----------         ------                     ABORH (Blood Type)[[id number]]                               In process                 Antibody Screen[[id number]]                                  In process                   Please view results for these tests on the individual orders.   ABORH (BLOOD TYPE)   ANTIBODY SCREEN   RAINBOW DRAW LAVENDER   RAINBOW DRAW LIGHT GREEN   RAINBOW DRAW BLUE   RAINBOW DRAW GOLD            CT BRAIN OR HEAD (CPT=70450)    Result Date: 4/8/2025  PROCEDURE:  CT BRAIN OR HEAD (23672)  COMPARISON:  None.  INDICATIONS:  head injury/pain, poor historian  TECHNIQUE:  Noncontrast CT scanning is performed through the brain. Dose reduction techniques were used. Dose information is transmitted to the ACR (American College of Radiology) NRDR (National Radiology Data Registry) which includes the Dose Index Registry.  PATIENT STATED HISTORY: (As transcribed by Technologist)  Patient collided with another player at softball, cleat to left side of head. Vomiting x4. C-Collar placed. Poor historian.   FINDINGS: Comminuted left temporal bone fracture noted with depression of multiple fragments.  Fracture planes also extend into the bilateral greater  sphenoid wings, floor of the left middle cranial fossa, sphenoid sinuses, sella, bilateral carotid canals, bilateral temporomandibular joints. Soft tissue laceration in the adjacent left temporal scalp along with soft tissue swelling.  There is an extra-axial hemorrhage along the lateral left cerebral convexity, most pronounced along the left temporal lobe as well as the floor of the left middle cranial fossa measuring up to 10 mm in thickness.  This is most likely in the epidural space.  There is mild local mass effect.  No hydrocephalus.  There is no significant midline shift.  The basal cisterns are patent.  The gray-white matter differentiation is intact.  Air-fluid levels in the sphenoid sinuses.            CONCLUSION:   1. Comminuted left temporal bone fracture noted with depression of multiple fragments.  Fracture planes also extend into the bilateral greater sphenoid wings, floor of the left middle cranial fossa, sphenoid sinuses, sella, bilateral carotid canals, bilateral temporomandibular joints. Soft tissue laceration in the adjacent left temporal scalp along with soft tissue swelling.  2. There is an extra-axial hemorrhage along the lateral left cerebral convexity, most pronounced along the left temporal lobe as well as the floor of the left middle cranial fossa measuring up to 10 mm in thickness.  This is most likely in the epidural space.  There is mild local mass effect.  Critical results were discussed with Dr. Bedoya at 1648 hours on 4/8/2025. Critical results were read back.   LOCATION:  [facility]   Dictated by (CST): Hermelindo Taylor MD on 4/08/2025 at 4:42 PM     Finalized by (CST): Hermelindo Taylor MD on 4/08/2025 at 4:51 PM       CT SPINE CERVICAL (CPT=72125)    Result Date: 4/8/2025  PROCEDURE:  CT SPINE CERVICAL (CPT=72125)  COMPARISON:  None.  INDICATIONS:  head trauma, poor historian, pain  TECHNIQUE:  Noncontrast CT scanning of the cervical spine is performed from the skull base through C7.   Multiplanar reconstructions are generated.  Dose reduction techniques were used. Dose information is transmitted to the ACR (American College of Radiology) NRDR (National Radiology Data Registry) which includes the Dose Index Registry.  PATIENT STATED HISTORY: (As transcribed by Technologist)  Patient collided with another player at [employer], cleat to left side of head. Vomiting x4. C-Collar placed. Poor historian.    FINDINGS:  There is slight straightening of the normal cervical lordosis.  This may be positional.  No significant spondylolisthesis is present.  Vertebral bodies and intervertebral disc spaces appear overall maintained in height.  There is no evidence of acute fracture of the cervical spine.  No significant bony central canal or neural foraminal stenosis is seen.  The paravertebral soft tissues are unremarkable in appearance.            CONCLUSION:  No evidence of acute fracture of the cervical spine.  No significant bony central canal or neural foraminal stenosis is seen.  If there is persistent clinical concern then recommend follow-up imaging with MRI.    LOCATION:  [id number]   Dictated by (CST): Quoc Zimmerman MD on 4/08/2025 at 4:42 PM     Finalized by (CST): Quoc Zimmerman MD on 4/08/2025 at 4:45 PM             MDM      24-year-old female presents altered after head injury.  Visible contusion and tenderness to the left temporal skull region    Additional history obtained by mom who reports that she did not see the collision but was at the game and was told that the patient vomited in the outfield after the injury    Differential includes but is not limited to skull fracture, intracranial hemorrhage, C-spine injury, concussion, contusions, a life/function threat.    Trauma labs ordered for further evaluation.    My independent interpretation of CT of the head is that there are visible fractures to the left skull area.  Fracture appears depressed.  Discussed imaging with radiology who reports numerous skull  fractures with subdural and epidural hemorrhage.    Reviewed imaging findings with Dr. Herrera from neurosurgery who evaluated the patient at the bedside and is actively taking her to the OR.  Patient continues to control her airway at this time.  The case was discussed with trauma surgery as well as neurointensive care.  Admitted to the [facility].        Critical care time:  A total of 60 minutes of critical care time (exclusive of billable procedures) was administered to manage the patient's critical imaging findings and neurologic instability due to her multiple skull fractures with subdural hematoma, epidural hematoma, altered mental status.  This involved direct patient intervention, complex decision making, and/or extensive discussions with the patient, family, and clinical staff.      Admission disposition: 4/8/2025  5:42 PM           Medical Decision Making      Disposition and Plan     Clinical Impression:  1. Acute subdural hematoma (HCC)    2. Epidural hematoma (HCC)    3. Closed depressed fracture of skull, initial encounter ([facility])    4. Altered mental status, unspecified altered mental status type         Disposition:  Admit  4/8/2025  5:42 pm    Follow-up:  No follow-up provider specified.        Medications Prescribed:  Current Discharge Medication List              Supplementary Documentation:         Hospital Problems       Present on Admission             ICD-10-CM Noted POA    * (Principal) Acute subdural hematoma (HCC) S06.5XAA 4/8/2025 Unknown    Altered mental status, unspecified altered mental status type R41.82 4/8/2025 Unknown    Closed depressed fracture of skull, initial encounter (HCC) S02.91XA 4/8/2025 Unknown    Epidural hematoma (HCC) S06.4XAA 4/8/2025 Unknown

## 2025-04-08 NOTE — CONSULTS
[facility]   part of [facility]    Neurosurgery Consult  2025    Adina Jefferson Patient Status:  Emergency    10/2/2000 MRN [id number]   Location [facility] SURGERY Attending Xiang Herrera,*   Hosp Day # 0 PCP No primary care provider on file.     REASON FOR CONSULT:    Epidural hematoma, skull fracture, altered mental status    HISTORY OF PRESENT ILLNESS:  Adina Jefferson is a(n) 24 year old female who was brought into the emergency department by EMS.  She is a college .  According to her parents, she was sliding into a base.  Her have been made contact with part of another player's body, most likely the foot.    The patient reportedly lost consciousness immediately, then quickly regained.  She was confused but speaking at the scene.    At this point, the patient is not able to provide any historical elements.    REVIEW OF SYSTEMS:  The 12 point checklist was reviewed and was negative except: As noted in HPI    ALLERGIES:  Allergies[1]    MEDICATIONS:  Prescriptions Prior to Admission[2]    HISTORY:  Past Medical History:    Anxiety     History reviewed. No pertinent surgical history.  No family history on file.  Adina  reports that she has never smoked. She has never used smokeless tobacco.    PHYSICAL EXAMINATION:  Vital Signs:  /88   Pulse 111   Temp 98 °F (36.7 °C) (Temporal)   Resp 17   Ht 67\"   Wt 130 lb (59 kg)   LMP  (LMP Unknown)   SpO2 95%   BMI 20.36 kg/m²     On examination, the patient is intermittently drowsy, then awake.  When drowsy, she is arousable to voice and light tactile stimulation.  She is profoundly dysphasic.  She will repeat words intermittently, but is not oriented to person, place, year, or the situation.  She moves all 4 extremities well.  No pronator drift.  She is able to follow commands in all 4 extremities.    REVIEW OF STUDIES:  CT head reviewed.  This demonstrates a comminuted depressed left temporal skull fracture.  There is  an underlying epidural hematoma with some mass effect on the brain.      ASSESSMENT AND PLAN:  1.  Epidural hematoma with altered mental status    2.  Depressed skull fracture    I had a conversation with her parents regarding her clinical condition.  Given the decline in her speech from the scene of the accident to present, as well as the findings on the CT scan which I reviewed with him, I recommended emergent surgical intervention.  This will involve left craniotomy versus craniectomy with elevation of depressed skull fracture and evacuation of subdural hematoma.    Risks and benefits of the operation were reviewed.  Risks of the operation include, but are not limited to, anesthetic complications, pain, death, paralysis, seizures, infection, need for more surgery, etc.    With that said, the risks of continued observation now outweigh the risks of an operation.  They are agreeable to proceed.    Total critical care time 45 minutes, including direct patient care, imaging and clinical review, and coordination of care with nursing and other services.  The patient has a life-threatening brain injury, and requires immediate stabilization and surgical intervention.          4/8/2025  6:26 PM      This report was dictated using voice recognition technology.  Errors in syntax or recognition may occur, and should not be construed to change the meaning of a sentence.          [1] No Known Allergies  [2]   Medications Prior to Admission   Medication Sig Dispense Refill Last Dose/Taking    sertraline 50 MG Oral Tab Take 1.5 tablets (75 mg total) by mouth daily.   Past Week    Norgestim-Eth Estrad Triphasic 0.18/0.215/0.25 MG-35 MCG Oral Tab 1 tablet Orally Once a day for 84 days   Past Week

## 2025-04-09 PROBLEM — D72.829 LEUKOCYTOSIS: Status: ACTIVE | Noted: 2025-04-09

## 2025-04-09 PROBLEM — S06.351A: Status: ACTIVE | Noted: 2025-04-09

## 2025-04-09 PROBLEM — S06.5XAA ACUTE SUBDURAL HEMATOMA (HCC): Status: RESOLVED | Noted: 2025-04-08 | Resolved: 2025-04-09

## 2025-04-09 LAB
ANION GAP SERPL CALC-SCNC: 8 MMOL/L (ref 0–18)
BASE EXCESS BLD CALC-SCNC: -9 MMOL/L
BASOPHILS # BLD AUTO: 0.02 X10(3) UL (ref 0–0.2)
BASOPHILS NFR BLD AUTO: 0.1 %
BUN BLD-MCNC: 11 MG/DL (ref 9–23)
CA-I BLD-SCNC: 1.02 MMOL/L (ref 1.12–1.32)
CALCIUM BLD-MCNC: 7.7 MG/DL (ref 8.7–10.6)
CHLORIDE SERPL-SCNC: 109 MMOL/L (ref 98–112)
CO2 BLD-SCNC: 17 MMOL/L (ref 22–32)
CO2 SERPL-SCNC: 24 MMOL/L (ref 21–32)
CREAT BLD-MCNC: 0.76 MG/DL (ref 0.55–1.02)
EGFRCR SERPLBLD CKD-EPI 2021: 114 ML/MIN/1.73M2 (ref 60–?)
EOSINOPHIL # BLD AUTO: 0 X10(3) UL (ref 0–0.7)
EOSINOPHIL NFR BLD AUTO: 0 %
ERYTHROCYTE [DISTWIDTH] IN BLOOD BY AUTOMATED COUNT: 11 %
GLUCOSE BLD-MCNC: 108 MG/DL (ref 70–99)
GLUCOSE BLD-MCNC: 118 MG/DL (ref 70–99)
GLUCOSE BLD-MCNC: 124 MG/DL (ref 70–99)
GLUCOSE BLD-MCNC: 127 MG/DL (ref 70–99)
GLUCOSE BLD-MCNC: 130 MG/DL (ref 70–99)
GLUCOSE BLD-MCNC: 148 MG/DL (ref 70–99)
HCO3 BLD-SCNC: 16.4 MEQ/L
HCT VFR BLD AUTO: 31.1 % (ref 35–48)
HCT VFR BLD CALC: 26 % (ref 34–50)
HGB BLD-MCNC: 10.4 G/DL (ref 12–16)
IMM GRANULOCYTES # BLD AUTO: 0.07 X10(3) UL (ref 0–1)
IMM GRANULOCYTES NFR BLD: 0.4 %
LYMPHOCYTES # BLD AUTO: 0.66 X10(3) UL (ref 1–4)
LYMPHOCYTES NFR BLD AUTO: 4 %
MCH RBC QN AUTO: 31.7 PG (ref 26–34)
MCHC RBC AUTO-ENTMCNC: 33.4 G/DL (ref 31–37)
MCV RBC AUTO: 94.8 FL (ref 80–100)
MONOCYTES # BLD AUTO: 0.38 X10(3) UL (ref 0.1–1)
MONOCYTES NFR BLD AUTO: 2.3 %
MRSA DNA SPEC QL NAA+PROBE: NEGATIVE
NEUTROPHILS # BLD AUTO: 15.17 X10 (3) UL (ref 1.5–7.7)
NEUTROPHILS # BLD AUTO: 15.17 X10(3) UL (ref 1.5–7.7)
NEUTROPHILS NFR BLD AUTO: 93.2 %
OSMOLALITY SERPL CALC.SUM OF ELEC: 293 MOSM/KG (ref 275–295)
PCO2 BLD: 27 MMHG
PH BLD: 7.39 [PH]
PLATELET # BLD AUTO: 182 10(3)UL (ref 150–450)
PO2 BLD: 458 MMHG
POTASSIUM BLD-SCNC: 3.6 MMOL/L (ref 3.6–5.1)
POTASSIUM SERPL-SCNC: 4.3 MMOL/L (ref 3.5–5.1)
RBC # BLD AUTO: 3.28 X10(6)UL (ref 3.8–5.3)
SAO2 % BLD: 100 %
SODIUM BLD-SCNC: 140 MMOL/L (ref 136–145)
SODIUM SERPL-SCNC: 141 MMOL/L (ref 136–145)
WBC # BLD AUTO: 16.3 X10(3) UL (ref 4–11)

## 2025-04-09 PROCEDURE — 99231 SBSQ HOSP IP/OBS SF/LOW 25: CPT

## 2025-04-09 PROCEDURE — 99291 CRITICAL CARE FIRST HOUR: CPT | Performed by: INTERNAL MEDICINE

## 2025-04-09 PROCEDURE — 99233 SBSQ HOSP IP/OBS HIGH 50: CPT | Performed by: HOSPITALIST

## 2025-04-09 RX ORDER — LEVETIRACETAM 500 MG/1
500 TABLET ORAL 2 TIMES DAILY
Status: DISCONTINUED | OUTPATIENT
Start: 2025-04-09 | End: 2025-04-13

## 2025-04-09 RX ORDER — HYDROMORPHONE HYDROCHLORIDE 1 MG/ML
0.4 INJECTION, SOLUTION INTRAMUSCULAR; INTRAVENOUS; SUBCUTANEOUS EVERY 2 HOUR PRN
Refills: 0 | Status: DISCONTINUED | OUTPATIENT
Start: 2025-04-09 | End: 2025-04-13

## 2025-04-09 RX ORDER — BUTALBITAL, ACETAMINOPHEN AND CAFFEINE 50; 325; 40 MG/1; MG/1; MG/1
1 TABLET ORAL EVERY 4 HOURS PRN
Status: DISCONTINUED | OUTPATIENT
Start: 2025-04-09 | End: 2025-04-13

## 2025-04-09 RX ORDER — HYDROMORPHONE HYDROCHLORIDE 1 MG/ML
0.8 INJECTION, SOLUTION INTRAMUSCULAR; INTRAVENOUS; SUBCUTANEOUS EVERY 2 HOUR PRN
Refills: 0 | Status: DISCONTINUED | OUTPATIENT
Start: 2025-04-09 | End: 2025-04-13

## 2025-04-09 RX ORDER — HYDROMORPHONE HYDROCHLORIDE 1 MG/ML
0.2 INJECTION, SOLUTION INTRAMUSCULAR; INTRAVENOUS; SUBCUTANEOUS EVERY 2 HOUR PRN
Refills: 0 | Status: DISCONTINUED | OUTPATIENT
Start: 2025-04-09 | End: 2025-04-13

## 2025-04-09 NOTE — ANESTHESIA PROCEDURE NOTES
Arterial Line    Performed by: Jb Luna MD  Authorized by: Jb Luna MD    General Information and Staff    Procedure Start:   Procedure End:  4/8/2025 6:33 PM  Anesthesiologist:  Jb Luna MD  Performed By:  Anesthesiologist  Patient Location:  OR  Indication: continuous blood pressure monitoring    Site Identification: real time ultrasound guided and image stored and retrievable    Preanesthetic Checklist: 2 patient identifiers, IV checked, risks and benefits discussed, monitors and equipment checked, pre-op evaluation, timeout performed, anesthesia consent and sterile technique used    Procedure Details    Catheter Size:  20 G  Catheter Length:  1 and 3/4 inch  Catheter Type:  Angiocath  Seldinger Technique?: No    Laterality:  Left  Site:  Radial artery  Site Prep: alcohol swabs    Line Secured:  Tape and Tegaderm    Assessment    Events: patient tolerated procedure well with no complications      Medications      Additional Comments

## 2025-04-09 NOTE — ANESTHESIA POSTPROCEDURE EVALUATION
[facility]    Adina Jefferson Patient Status:  Inpatient   Age/Gender 24 year old female MRN [id number]   Location [facility] 6NE-A Attending Xiang Herrera,*   Hosp Day # 0 PCP No primary care provider on file.       Anesthesia Post-op Note    LEFT CRANIOTOMY FOR EVACUATION OF EPIDURAL HEMATOMA, ELEVATION OF DEPRESSED SKULL FRACTURE    Procedure Summary       Date: 04/08/25 Room / Location:  MAIN OR [address] [place] [address] MAIN OR    Anesthesia Start: 1816 Anesthesia Stop: 2109    Procedure: LEFT CRANIOTOMY FOR EVACUATION OF EPIDURAL HEMATOMA, ELEVATION OF DEPRESSED SKULL FRACTURE (Left: Head) Diagnosis:       Epidural hematoma (HCC)      (Epidural hematoma (HCC) [S06.4XAA])    Surgeons: Xiang Herrera MD Anesthesiologist: Jb Luna MD    Anesthesia Type: general ASA Status: 5 - Emergent            Anesthesia Type: No value filed.    Vitals Value Taken Time   /75 04/08/25 2145   Temp 97.3 °F (36.3 °C) 04/08/25 2104   Pulse 69 04/08/25 2145   Resp 18 04/08/25 2145   SpO2 98 % 04/08/25 2145   Vitals shown include unfiled device data.        Patient Location: ICU    Anesthesia Type: general    Airway Patency: patent    Postop Pain Control: adequate    Mental Status: preanesthetic baseline    Nausea/Vomiting: none    Cardiopulmonary/Hydration status: stable euvolemic    Complications: no apparent anesthesia related complications    Postop vital signs: stable    Dental Exam: Unchanged from Preop    Patient to be discharged from PACU when criteria met.

## 2025-04-09 NOTE — PAYOR COMM NOTE
ADMISSION REVIEW     Payor: [facility]  Subscriber #:  [id number]  Authorization Number: [id number]    Admit date: 4/8/25  Admit time:  9:01 PM       REVIEW DOCUMENTATION:     ED Provider Notes        ED Provider Notes signed by Oren Bedoya MD at 4/8/2025  6:28 PM      History     Chief Complaint   Patient presents with    Trauma 1 & 2    Head Neck Injury     Stated Complaint:     Subjective:   HPI      24-year-old female brought in by EMS after a head injury while playing softball at the local [employer].  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  It is unknown if she lost consciousness at the scene but she has not been acting like her normal self.  Injury occurred just prior to arrival.  Patient reportedly vomited at the scene.  Patient does not remember the incident and is currently unable to provide any significant history.    Objective:     Past Medical History:    Anxiety     ED Triage Vitals [04/08/25 1616]   /80   Pulse 70   Resp 10   Temp 98 °F (36.7 °C)   Temp src Temporal   SpO2 100 %   O2 Device None (Room air)       Current Vitals:   Vital Signs  BP: 150/88  Pulse: 111  Resp: 17  Temp: 98 °F (36.7 °C)  Temp src: Temporal  MAP (mmHg): (!) 109    Oxygen Therapy  SpO2: 95 %  O2 Device: Nasal cannula  O2 Flow Rate (L/min): 2 L/min        Physical Exam  General:  Vitals as listed.    HEENT: Soft contusion to the left temporal area.  No laceration.  No Vazquez sign.  No hemotympanums.  There is some dried blood in bilateral nasal passages.  No tenderness on palpation of the bridge of the nose.  No obvious nose deformity or septal hematoma.  Neck: C-collar in place.  Denying bony tenderness on palpation  Lungs: good air exchange and clear   Heart: regular rate rhythm and no murmur   Abdomen: Soft and nontender.  No peritoneal signs   Extremities: No obvious traumatic injury to the extremities.  No  edema  Neuro: Awake and following some commands.  Responding to some questioning but reliability of answers is uncertain.  She is slow to answer and certainly appears altered  Skin: no rashes or nodules    ED Course     Labs Reviewed   PTT, ACTIVATED - Abnormal; Notable for the following components:       Result Value    PTT 21.9 (*)     All other components within normal limits   PROTHROMBIN TIME (PT) - Normal   CBC WITH DIFFERENTIAL WITH PLATELET   COMP METABOLIC PANEL (14)   CBC WITH DIFFERENTIAL WITH PLATELET   TYPE AND SCREEN    Narrative:     The following orders were created for panel order Type and screen.  Procedure                               Abnormality         Status                     ---------                               -----------         ------                     ABORH (Blood Type)[[id number]]                               In process                 Antibody Screen[[id number]]                                  In process                   Please view results for these tests on the individual orders.   ABORH (BLOOD TYPE)   ANTIBODY SCREEN   RAINBOW DRAW LAVENDER   RAINBOW DRAW LIGHT GREEN   RAINBOW DRAW BLUE   RAINBOW DRAW GOLD     CT BRAIN OR HEAD (CPT=70450)    Result Date: 4/8/2025  PROCEDURE:  CT BRAIN OR HEAD (29551)  COMPARISON:  None.  INDICATIONS:  head injury/pain, poor historian  TECHNIQUE:  Noncontrast CT scanning is performed through the brain. Dose reduction techniques were used. Dose information is transmitted to the ACR (American College of Radiology) NRDR (National Radiology Data Registry) which includes the Dose Index Registry.  PATIENT STATED HISTORY: (As transcribed by Technologist)  Patient collided with another player at softball, cleat to left side of head. Vomiting x4. C-Collar placed. Poor historian.   FINDINGS: Comminuted left temporal bone fracture noted with depression of multiple fragments.  Fracture planes also extend into the bilateral greater sphenoid wings, floor of  the left middle cranial fossa, sphenoid sinuses, sella, bilateral carotid canals, bilateral temporomandibular joints. Soft tissue laceration in the adjacent left temporal scalp along with soft tissue swelling.  There is an extra-axial hemorrhage along the lateral left cerebral convexity, most pronounced along the left temporal lobe as well as the floor of the left middle cranial fossa measuring up to 10 mm in thickness.  This is most likely in the epidural space.  There is mild local mass effect.  No hydrocephalus.  There is no significant midline shift.  The basal cisterns are patent.  The gray-white matter differentiation is intact.  Air-fluid levels in the sphenoid sinuses.            CONCLUSION:   1. Comminuted left temporal bone fracture noted with depression of multiple fragments.  Fracture planes also extend into the bilateral greater sphenoid wings, floor of the left middle cranial fossa, sphenoid sinuses, sella, bilateral carotid canals, bilateral temporomandibular joints. Soft tissue laceration in the adjacent left temporal scalp along with soft tissue swelling.  2. There is an extra-axial hemorrhage along the lateral left cerebral convexity, most pronounced along the left temporal lobe as well as the floor of the left middle cranial fossa measuring up to 10 mm in thickness.  This is most likely in the epidural space.  There is mild local mass effect.  Critical results were discussed with Dr. Bedoya at 1648 hours on 4/8/2025. Critical results were read back.   LOCATION:  [facility]   Dictated by (CST): Hermelindo Taylor MD on 4/08/2025 at 4:42 PM     Finalized by (CST): Hermelindo Taylor MD on 4/08/2025 at 4:51 PM       CT SPINE CERVICAL (CPT=72125)    Result Date: 4/8/2025  PROCEDURE:  CT SPINE CERVICAL (CPT=72125)  COMPARISON:  None.  INDICATIONS:  head trauma, poor historian, pain  TECHNIQUE:  Noncontrast CT scanning of the cervical spine is performed from the skull base through C7.  Multiplanar  reconstructions are generated.  Dose reduction techniques were used. Dose information is transmitted to the ACR (American College of Radiology) NRDR (National Radiology Data Registry) which includes the Dose Index Registry.  PATIENT STATED HISTORY: (As transcribed by Technologist)  Patient collided with another player at [employer], cleat to left side of head. Vomiting x4. C-Collar placed. Poor historian.    FINDINGS:  There is slight straightening of the normal cervical lordosis.  This may be positional.  No significant spondylolisthesis is present.  Vertebral bodies and intervertebral disc spaces appear overall maintained in height.  There is no evidence of acute fracture of the cervical spine.  No significant bony central canal or neural foraminal stenosis is seen.  The paravertebral soft tissues are unremarkable in appearance.            CONCLUSION:  No evidence of acute fracture of the cervical spine.  No significant bony central canal or neural foraminal stenosis is seen.  If there is persistent clinical concern then recommend follow-up imaging with MRI.    LOCATION:  [id number]   Dictated by (CST): Quoc Zimmerman MD on 4/08/2025 at 4:42 PM     Finalized by (CST): Quoc Zimmerman MD on 4/08/2025 at 4:45 PM             24-year-old female presents altered after head injury.  Visible contusion and tenderness to the left temporal skull region    Additional history obtained by mom who reports that she did not see the collision but was at the game and was told that the patient vomited in the outfield after the injury    Differential includes but is not limited to skull fracture, intracranial hemorrhage, C-spine injury, concussion, contusions, a life/function threat.    Trauma labs ordered for further evaluation.    My independent interpretation of CT of the head is that there are visible fractures to the left skull area.  Fracture appears depressed.  Discussed imaging with radiology who reports numerous skull fractures with  subdural and epidural hemorrhage.    Reviewed imaging findings with Dr. Herrera from neurosurgery who evaluated the patient at the bedside and is actively taking her to the OR.  Patient continues to control her airway at this time.  The case was discussed with trauma surgery as well as neurointensive care.  Ad[facility].        Critical care time:  A total of 60 minutes of critical care time (exclusive of billable procedures) was administered to manage the patient's critical imaging findings and neurologic instability due to her multiple skull fractures with subdural hematoma, epidural hematoma, altered mental status.  This involved direct patient intervention, complex decision making, and/or extensive discussions with the patient, family, and clinical staff.      Admission disposition: 4/8/2025  5:42 PM           Medical Decision Making      Disposition and Plan     Clinical Impression:  1. Acute subdural hematoma (HCC)    2. Epidural hematoma (HCC)    3. Closed depressed fracture of skull, initial encounter ([facility])    4. Altered mental status, unspecified altered mental status type         Disposition:  Admit       Present on Admission             ICD-10-CM Noted POA    * (Principal) Acute subdural hematoma (HCC) S06.5XAA 4/8/2025 Unknown    Altered mental status, unspecified altered mental status type R41.82 4/8/2025 Unknown    Closed depressed fracture of skull, initial encounter ([facility]) S02.91XA 4/8/2025 Unknown    Epidural hematoma (HCC) S06.4XAA 4/8/2025 Unknown            4/8       HOSPITALIST:     History and Physical       Chief Complaint: Head trauma     Subjective:  History of Present Illness:      Adina Jefferson is a 24 year old female with past medical history of anxiety and depression presents emergency room after playing a game of softball where the patient collided with another player and was struck in the head with a cleat.  Patient was arousable slow to respond.  She does not know who  her parents are aware she is at or what happened.  She does not know her last name and does not understand simple objects.  She does know her first name and her date of birth.  No fevers, chills, diarrhea.  No loss of bladder or bowel function.  No back pain, abdominal pain, chest pain.      Physical Exam:    BP (!) 136/97   Pulse 93   Temp 98 °F (36.7 °C) (Temporal)   Resp 16   Ht 5' 7\" (1.702 m)   Wt 130 lb (59 kg)   LMP  (LMP Unknown)   SpO2 100%   BMI 20.36 kg/m²   General: No acute distress, Alert  Respiratory: No rhonchi, no wheezes  Cardiovascular: S1, S2. Regular rate and rhythm  Abdomen: Soft, Non-tender, non-distended, positive bowel sounds  Neuro: No new focal deficits  Extremities: No edema    ssment & Plan:  # Multiple skull fracture including left temporal and bilateral greater sphenoid wings, left middle cranial fossa, sphemoid sinuses, sella, and carotid canals, bilateral temporomandibular joints.  - neurosurgery and NCC on consult.     # Left lateral cerebral hemorrhage  - Will admit to NICU  - Neurosurgery on consult  - NCC on consult  - Liely going to the OR tonight with worsening  lethergy. Pt to go for a left craniotomy for evacuation of epidural hematoma.       NEUROSURGERY:     Neurosurgery Consult  2025           Adina Jefferson Patient Status:  Emergency    10/2/2000 MRN [id number]   Location [facility] SURGERY Attending Xiang Herrera,*   Hosp Day # 0 PCP No primary care provider on file.      REASON FOR CONSULT:    Epidural hematoma, skull fracture, altered mental status     HISTORY OF PRESENT ILLNESS:  Adina Jefferson is a(n) 24 year old female who was brought into the emergency department by EMS.  She is a college .  According to her parents, she was sliding into a base.  Her have been made contact with part of another player's body, most likely the foot.     The patient reportedly lost consciousness immediately, then quickly regained.  She was confused  but speaking at the scene.       PHYSICAL EXAMINATION:  Vital Signs:  /88   Pulse 111   Temp 98 °F (36.7 °C) (Temporal)   Resp 17   Ht 67\"   Wt 130 lb (59 kg)   LMP  (LMP Unknown)   SpO2 95%   BMI 20.36 kg/m²      On examination, the patient is intermittently drowsy, then awake.  When drowsy, she is arousable to voice and light tactile stimulation.  She is profoundly dysphasic.  She will repeat words intermittently, but is not oriented to person, place, year, or the situation.  She moves all 4 extremities well.  No pronator drift.  She is able to follow commands in all 4 extremities.     REVIEW OF STUDIES:  CT head reviewed.  This demonstrates a comminuted depressed left temporal skull fracture.  There is an underlying epidural hematoma with some mass effect on the brain.        ASSESSMENT AND PLAN:  1.  Epidural hematoma with altered mental status     2.  Depressed skull fracture     I had a conversation with her parents regarding her clinical condition.  Given the decline in her speech from the scene of the accident to present, as well as the findings on the CT scan which I reviewed with him, I recommended emergent surgical intervention.  This will involve left craniotomy versus craniectomy with elevation of depressed skull fracture and evacuation of subdural hematoma.     Risks and benefits of the operation were reviewed.  Risks of the operation include, but are not limited to, anesthetic complications, pain, death, paralysis, seizures, infection, need for more surgery, etc.     With that said, the risks of continued observation now outweigh the risks of an operation.  They are agreeable to proceed.     Total critical care time 45 minutes, including direct patient care, imaging and clinical review, and coordination of care with nursing and other services.  The patient has a life-threatening brain injury, and requires immediate stabilization and surgical intervention.       NEUROSURGERY:     Brief Op  Note     Signed     Date of Service: 2025  8:28 PM     Signed         Pre-Operative Diagnosis: Epidural hematoma (HCC) [S06.4XAA]     Post-Operative Diagnosis: Epidural hematoma (HCC) [S06.4XAA]      Procedure Performed:   LEFT CRANIOTOMY FOR EVACUATION OF EPIDURAL HEMATOMA, ELEVATION OF DEPRESSED SKULL FRACTURE     Surgeons and Role:     * Xiang Herrera MD - Primary     Assistant(s):  Surgical Assistant.: Paolo Perea           Estimated Blood Loss: Blood Output: 50 mL (2025  8:01 PM)           Xiang Herrera MD  2025                  SURGERY:           [facility]  Report of  Surgical Consultation with History and Physical Exam           Adina Jefferson Patient Status:  Inpatient    10/2/2000 MRN [id number]   Location [facility] 6NE-A Attending Xiang Herrera,*   Hosp Day # 0 PCP No primary care provider on file.      Referring Provider:   Oren Bedoya MD      Reason for Surgical Consultation:  Trauma Level 2      History of Present Illness:  Adina Jefferson is a 24 year old female who sustained a head trauma this afternoon while playing softball. The HPI was mostly obtained through chart review and from the patient. Patient was sliding into a base when she made contact with another player. Reports trauma to the head only . Positive loss of consciousness on the scene that quickly regained.        Physical Exam:  Blood pressure 150/88, pulse 79, temperature 97.3 °F (36.3 °C), temperature source Temporal, resp. rate 14, height 67\", weight 127 lb 10.3 oz (57.9 kg), SpO2 95%.     General: Alert, orientated x3.  Cooperative. Somnolent.  Not in apparent distress.     HEENT: Head with clean dressing, drain in place with moderate amount of bloody output. Without scleral icterus.  Mucous membranes are moist. EOM are WNL.      Neuro:  Motor and sensory intact throughout, CN within normal limits     Neck: No tenderness to palpitation.  Full range of motion to flexion and  extension, lateral rotation and lateral flexion of cervical spine.  No JVD. Supple.      Lungs: Bilateral chest rise. Good excursion of the diaphragms. No secondary use of accessory respiratory musculature.     Cardiac: Regular rate and rhythm. No murmur.     Abdomen:  soft, non tender, non distended, no rebound or guarding     Pelvis: stable , non tender     Spine: non tender, no step offs or deformities, cspine in collar     Extremities:  No lower extremity edema noted.  Without clubbing or cyanosis.  No signs of trauma      Skin: Normal texture and turgor.          Impression/Plan:  Problem List       Patient Active Problem List   Diagnosis    Acute subdural hematoma (HCC)    Epidural hematoma (HCC)    Closed depressed fracture of skull, initial encounter ([facility])    Altered mental status, unspecified altered mental status type            24 year old female who sustained a blunt head trauma. CT scan of the head showed epidural hemorrhage with a depressed skull fracture. She was taken emergently by dr. Herrera for evacuation of the epidural hematoma and elevation of the depressed skull fracture. CT c spine does not show any acute injury. No other signs of trauma on thorough exam   Patient will go for CTA head and neck to evaluate for any BSVI . Will obtain a CT of the facial bones to assess for any other facial fractures.   Patient in the neuro ICU at this time, appreciate their management. Rest of care per Neuro critical care and Neurosurgery. Q1 hour neurochecks, SBP goal < 140.         4/9    HOSPITALIST:         Chief Complaint: Head trauma      Subjective:    Patient is having some pain/headache to surgical site.      Vital signs:  Temp:  [97.3 °F (36.3 °C)-98.8 °F (37.1 °C)] 98.8 °F (37.1 °C)  Pulse:  [] 97  Resp:  [10-18] 14  BP: (106-150)/(66-97) 111/75  SpO2:  [90 %-100 %] 97 %  AO: (100-129)/(64-73) 100/64       Assessment & Plan:  #Left hemisphere Epidural Hematoma  S/p craniotomy withi evacuation    Neurosurgery and NeuroCC followinng  PT/OT/ST and frequent neurochecks  [facility] [place] for Seizure precautions  Pain control     #Multiple skull fractures  Reviewed CT imaging   S/p sports injury   Neurosurgery and Gen surgery     #Anemia  Hg 11.2 -> 10.4 post procedure  No e/o of acute blood loss  Will trend CBC    #Leukocytosis  Likely reactive  Afebrile, monitor off abx at this time          4/9       NEURO:     Neurocritical Care Consult Note       Reason for Consultation:   EDH     HPI:   Patient is a 24 year old female with a h/o anxiety and depression p/w traumatic L epidural hematoma 4/8. Pt collided with another player during softball game resulting in head trauma, noted to have AMS/n/v thus brought to ED where w/u revealed ct head with mult skull fractures and L epidural hematoma, thus taken to OR for emergent crani for evac, and pt was transferred to cnicu for further monitoring.           MEDICATIONS ADMINISTERED IN LAST 1 DAY:  bacitracin 500 UNIT/GM ointment       Date Action Dose Route User    4/8/2025 2004 Given 1 Application Topical (Left Scalp) Xiang Herrera MD          ceFAZolin (Ancef) injection       Date Action Dose Route User    4/8/2025 1852 Given 2 g Intravenous Jb Luna MD          dexamethasone (Decadron) 4 MG/ML injection       Date Action Dose Route User    4/8/2025 1854 Given 10 mg Intravenous Jb Luna MD          esmolol (Brevibloc) 100 mg/10mL injection       Date Action Dose Route User    4/8/2025 1821 Given 50 mg Intravenous Jb Luna MD          fentaNYL (Sublimaze) 50 mcg/mL injection       Date Action Dose Route User    4/8/2025 2042 Given 50 mcg Intravenous Jb Luna MD    4/8/2025 2020 Given 50 mcg Intravenous Jb Luna MD    4/8/2025 1908 Given 50 mcg Intravenous Jb Luna MD          Gelatin Absorbable (GELFOAM) powder POWD       Date Action Dose Route User    4/8/2025 1945 Given 1 g Topical (Other) Javier  Xiang Erwin MD    4/8/2025 1905 Given 1 g Topical (Other) Xiang Herrera MD          HYDROmorphone (Dilaudid) 1 MG/ML injection 0.2 mg       Date Action Dose Route User    4/8/2025 2151 Given 0.2 mg Intravenous [place]          HYDROmorphone (Dilaudid) 1 MG/ML injection 0.4 mg       Date Action Dose Route User    4/9/2025 0403 Given 0.4 mg Intravenous Doni Gooden RN    4/9/2025 0103 Given 0.4 mg Intravenous Doni Gooden RN    4/8/2025 2337 Given 0.4 mg Intravenous Doni Gooden RN          HYDROmorphone (Dilaudid) 1 MG/ML injection 0.6 mg       Date Action Dose Route User    4/8/2025 2236 Given 0.6 mg Intravenous Doni Gooden RN HYDROmorphone (Dilaudid) 1 MG/ML injection 0.4 mg       Date Action Dose Route User    4/9/2025 1039 Given 0.4 mg Intravenous [place] Rosie Santoyo RN    4/9/2025 0814 Given 0.4 mg Intravenous Rosie Montelongo RN    4/9/2025 0609 Given 0.4 mg Intravenous Doni Gooden RN          iopamidol 76% (ISOVUE-370) injection for power injector       Date Action Dose Route User    4/8/2025 2233 Given 75 mL Intravenous Mcmanus, Crystal          levETIRAcetam (Keppra) 500 mg/5mL injection 500 mg       Date Action Dose Route User    4/8/2025 1851 Given 1,000 mg Intravenous Jb Luna MD          levETIRAcetam (Keppra) 500 mg/5mL injection 500 mg       Date Action Dose Route User    4/9/2025 0600 Given 500 mg Intravenous Doni Gooden RN          lidocaine PF (Xylocaine-MPF) 1% injection       Date Action Dose Route User    4/8/2025 1825 Given 25 mg Injection Jb Luna MD          lidocaine 1%-EPINEPHrine 1:100,000 (Xylocaine-Epinephrine) injection       Date Action Dose Route User    4/8/2025 1855 Given 16 mL Infiltration (Left Scalp) Xiang Herrera MD          norepinephrine (Levophed) 4 mg/250mL infusion premix       Date Action Dose Route User    4/8/2025 1834 Given  4 mcg Intravenous Jb Luna MD          ondansetron (Zofran) 4 MG/2ML injection       Date Action Dose Route User    4/8/2025 1709 Given 4 mg Intravenous Faisal Mora RN          ondansetron (Zofran) 4 MG/2ML injection 4 mg       Date Action Dose Route User    4/8/2025 1709 Given 4 mg Intravenous Faisal Mora RN          pantoprazole (Protonix) 40 mg in sodium chloride 0.9% PF 10 mL IV push       Date Action Dose Route User    4/9/2025 0602 Given 40 mg Intravenous Doni Gooden RN          propofol (Diprivan) 10 MG/ML injection       Date Action Dose Route User    4/8/2025 1825 Given 160 mg Intravenous Jb Luna MD          propofol (Diprivan) 10 mg/mL infusion premix       Date Action Dose Route User    4/8/2025 1958 Rate/Dose Change 40 mcg/kg/min × 59 kg Intravenous Jb Luna MD    4/8/2025 1857 New Bag 100 mcg/kg/min × 59 kg Intravenous Jb Luna MD          rocuronium (Zemuron) 50 mg/5mL injection       Date Action Dose Route User    4/8/2025 1825 Given 100 mg Intravenous Jb Luna MD          sodium chloride 0.9% infusion       Date Action Dose Route User    4/8/2025 1816 New Bag (none) Intravenous Jb Luna MD          sodium chloride 0.9% infusion       Date Action Dose Route User    4/8/2025 1840 New Bag (none) Intravenous Jb Luna MD          sodium chloride 0.9% infusion       Date Action Dose Route User    4/9/2025 0000 Rate/Dose Verify (none) Intravenous Doni Gooden RN    4/8/2025 2114 New Bag (none) Intravenous Doni Gooden RN          sugammadex (Bridion) 200 MG/2ML injection       Date Action Dose Route User    4/8/2025 2034 Given 200 mg Intravenous Jb Luna MD          thrombin (Thrombin-JMI) 5000 units topical solution       Date Action Dose Route User    4/8/2025 1945 Given 10,000 Units Topical (Other) Xiang Herrera MD    4/8/2025 1902 Given 15,000 Units Topical (Other)  Xiang Herrera MD            Vitals (last day)       Date/Time Temp Pulse Resp BP SpO2 Weight O2 Device O2 Flow Rate (L/min) [facility]    04/09/25 1200 99 °F (37.2 °C) 69 14 103/71 95 % -- None (Room air) -- CV    04/09/25 1100 -- 70 13 104/66 95 % -- -- -- CV    04/09/25 1000 -- 68 15 104/67 95 % -- -- -- CV    04/09/25 0900 -- 68 16 102/67 95 % -- -- -- CV    04/09/25 0800 -- 101 14 110/72 97 % -- None (Room air) -- CV    04/09/25 0700 -- 97 14 111/75 97 % -- -- -- FS    04/09/25 0600 -- 94 15 118/69 97 % -- -- -- FS    04/09/25 0500 -- 82 13 117/75 96 % -- -- -- FS    04/09/25 0400 98.8 °F (37.1 °C) 81 15 120/82 98 % -- None (Room air) -- FS    04/09/25 0300 -- 72 15 112/75 96 % -- -- -- FS    04/09/25 0200 -- 78 14 115/66 97 % -- -- -- FS    04/09/25 0100 -- 75 15 112/77 97 % -- -- -- FS    04/09/25 0000 97.9 °F (36.6 °C) 80 15 106/72 97 % -- None (Room air) -- FS    04/08/25 2300 -- 60 12 113/71 98 % -- -- -- FS    04/08/25 2200 -- 58 15 107/75 97 % -- None (Room air) -- FS    04/08/25 2104 97.3 °F (36.3 °C) 79 14 131/85 95 % 127 lb 10.3 oz (57.9 kg) Nasal cannula 6 L/min FS    04/08/25 1800 -- 111 17 150/88 95 % -- Nasal cannula 2 L/min AW    04/08/25 1750 -- 106 12 131/83 99 % -- -- -- AW    04/08/25 1730 -- 93 16 136/97 100 % -- -- -- AW 04/08/25 1715 -- 74 18 117/90 90 % -- -- -- AW 04/08/25 17:07:33 -- -- -- -- -- -- None (Room air) -- AW 04/08/25 1700 -- 86 15 117/73 100 % -- None (Room air) -- AW 04/08/25 1650 -- 71 17 111/67 100 % -- -- -- AW 04/08/25 1616 98 °F (36.7 °C) 70 10 113/80 100 % 130 lb (59 kg) None (Room air) -- AV

## 2025-04-09 NOTE — OCCUPATIONAL THERAPY NOTE
OCCUPATIONAL THERAPY EVALUATION - INPATIENT     Room Number: 6601/6601-A  Evaluation Date: 4/9/2025  Type of Evaluation: Initial  Presenting Problem: Acute SDH after collision at softball game    Physician Order: IP Consult to Occupational Therapy  Reason for Therapy: ADL/IADL Dysfunction and Discharge Planning    OCCUPATIONAL THERAPY ASSESSMENT   Patient is currently functioning below baseline with toileting, upper body dressing, lower body dressing, grooming, bed mobility, transfers, static sitting balance, dynamic sitting balance, static standing balance, dynamic standing balance, maintaining seated position, functional standing tolerance, and energy conservation strategies. 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  Patient is requiring moderate assistance as a result of the following impairments: decreased functional strength, decreased functional reach, decreased endurance, impaired coordination, impaired motor planning, decreased muscular endurance, and medical status. Occupational Therapy will continue to follow for duration of hospitalization.    Patient will benefit from continued skilled OT Services at discharge to promote prior level of function and safety with additional support and return home with Day Rehab    History Related to Current Admission: Patient is a 24 year old female admitted on 4/8/2025 with Presenting Problem: Acute SDH after collision at softball game. Co-Morbidities : none    WEIGHT BEARING RESTRICTION       Recommendations for nursing staff:   Transfers: CGA  Toileting location: toilet    EVALUATION SESSION:  Patient Start of Session: supine in bed for session    FUNCTIONAL TRANSFER ASSESSMENT  Sit to Stand: Edge of Bed  Edge of Bed: Contact Guard Assist    BED MOBILITY  Rolling: Contact Guard Assist  Supine to Sit : Contact Guard Assist  Scooting: CGA  to EOB    BALANCE ASSESSMENT  Static Sitting: Contact Guard Assist  Static Standing: Contact Guard Assist (to stand and amb in room and hallway)    FUNCTIONAL ADL ASSESSMENT  UB Dressing Seated: Minimal Assist (for robe on back)  LB Dressing Seated: Moderate Assist (for socks)    ACTIVITY TOLERANCE: vitals stable                         O2 SATURATIONS       COGNITION  Overall Cognitive Status:  WFL - within functional limits    Upper Extremity   ROM: within functional limits   Strength: within functional limits   Coordination  Gross motor: WNl  Fine motor: WNL  Sensation: Light touch:  intact    EDUCATION PROVIDED  Patient Education : Role of Occupational Therapy; Plan of Care  Patient's Response to Education: Verbalized Understanding; Returned Demonstration    Equipment used: RW  Demonstrates functional use, Would benefit from additional trial      Therapist comments: Pt educated on safety body mechanics after SDH and evac surgery.  Pt with no ocular motor deficits but with photo sensitivity.  Pt educated on techniques to assist with photosensitivity.     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    SUBJECTIVE   Pt stated, \"I am doing okay.\"    PAIN ASSESSMENT  Ratin  Location: no pain at this time       OBJECTIVE  Precautions: Seizure,  Drain(s)  Fall Risk: Standard fall risk    ASSESSMENTS    AM-PAC ‘6-Clicks’ Inpatient Daily Activity Short Form  -   Putting on and taking off regular lower body clothing?: A Lot  -   Bathing (including washing, rinsing, drying)?: A Lot  -   Toileting, which includes using toilet, bedpan or urinal? : A Lot  -   Putting on and taking off regular upper body clothing?: A Little  -   Taking care of personal grooming such as brushing teeth?: A Little  -   Eating meals?: A Little    AM-PAC Score:  Score: 15  Approx Degree of Impairment: 56.46%  Standardized Score (AM-PAC Scale): 34.69    ADDITIONAL TESTS     NEUROLOGICAL FINDINGS      COGNITION ASSESSMENTS     PLAN     OT Treatment Plan: Balance activities, Energy conservation/work simplification techniques, ADL training, IADL training, Continued evaluation, Compensatory technique education, Equipment eval/education, Patient/Family training, Patient/Family education, Endurance training, UE strengthening/ROM, Functional transfer training  Rehab Potential : Good  Frequency: 3-5x/week  Number of Visits to Meet Established Goals: 5    ADL Goals   Patient will perform upper body dressing:  with supervision  Patient will perform lower body dressing:  with supervision  Patient will perform toileting: with supervision    Functional Transfer Goals  Patient will transfer from supine to sit:  with supervision  Patient will transfer from sit to stand:  with supervision  Patient will transfer to toilet:  with supervision    UE Exercise Program Goal  Patient will be supervision with bilateral AROM HEP (home exercise program).    Additional Goals:  Pt will verbalize at least 3 energy conservation techniques  Pt will stand at sink for 5 minutes to complete grooming routine    Patient Evaluation Complexity Level:   Occupational Profile/Medical History MODERATE - Expanded review of history including review of medical or therapy record   Specific performance deficits impacting engagement in  ADL/IADL MODERATE  3 - 5 performance deficits   Client Assessment/Performance Deficits MODERATE - Comorbidities and min to mod modifications of tasks    Clinical Decision Making MODERATE - Analysis of occupational profile, detailed assessments, several treatment options    Overall Complexity MODERATE     OT Session Time: 30 minutes  Self-Care Home Management: 15 minutes  Therapeutic Activity: 0 minutes  Neuromuscular Re-education: 0 minutes  Therapeutic Exercise: 0 minutes  Cognitive Skills: 0 minutes  Sensory Integrative: 0 minutes  Orthotic Management and Trainin minutes  Can add/delete any of these

## 2025-04-09 NOTE — ANESTHESIA PROCEDURE NOTES
Airway  Date/Time: 4/8/2025 6:26 PM  Urgency: elective      General Information and Staff    Patient location during procedure: OR  Anesthesiologist: Jb Luna MD  Performed: anesthesiologist   Performed by: Jb Luna MD  Authorized by: Jb Luna MD      Indications and Patient Condition  Indications for airway management: anesthesia  Spontaneous Ventilation: absent  Sedation level: deep  Preoxygenated: yes  Patient position: sniffing  Mask difficulty assessment: 0 - not attempted    Final Airway Details  Final airway type: endotracheal airway      Successful airway: ETT  Cuffed: yes   Successful intubation technique: direct laryngoscopy  Facilitating devices/methods: intubating stylet  Endotracheal tube insertion site: oral  Blade: Rao  Blade size: #3  ETT size (mm): 7.5    Cormack-Lehane Classification: grade I - full view of glottis  Placement verified by: capnometry   Measured from: teeth  ETT to teeth (cm): 23  Number of attempts at approach: 1    Additional Comments  RSI performed with cricoid pressure maintained throughout, HOB elevated 30 deg, suction on standby. C-collar left in place. Straight to glidescope with #3 blade, minimal manipulation of mouth to insert blade and obtain view, easy intubation with glidescope. Small laceration to lip during intubation. Dentition unchanged after DL and intubation.

## 2025-04-09 NOTE — SLP NOTE
ADULT SWALLOWING EVALUATION    ASSESSMENT    ASSESSMENT/OVERALL IMPRESSION:  Pt seen for a swallowing evaluation. Pt presents with PMH of anxiety and depression.  Pt was admitted on 04/08/2025 for a head injury while playing softball. Pt was diving to make a catch, and collided with another player. According to neurosurgery's note: Pt was profoundly dysphasic. She will repeat words intermittently, but is not orientated to person, place, year, or the situation. Pt had emergency left craniotomy for evacuation of epidural hematoma last night. Today, Pt was in bed upon entry to the room. Pts parents were present at bedside during evaluation. Pt reported that her language was close to being back to normal. Pt was able to state where she attends school and position she plays in softball. Pt was able to engage in conversation appropriately. Pts family reported drastic improvement in language. Pts voice quality was clear throughout evaluation. Pt was reclined, and reported of nausea prior to any po trials. SLP repositioned Pt slowly up to 60-70 degrees to initiate po trials. Pt reported she was comfortable, and was agreeable to participate in evaluation.     Oral mechanism exam was unremarkable. Strength, tone, and ROM were WFL. Pt self presented thin liquids via cup, thin liquids via straw, and hard solids with intact oral retrieval and containment. Pts oral prep and transit were WFL. Mastication of solids WFL. Pt reported slight pain on the left side of her jaw with chewing, this is likely related to the location of the surgical wound. Laryngeal excursion appeared to be adequate of strength and timeliness to palpation. There were no overt signs of aspiration.      Pt presents with an overall functional oropharyngeal swallow. Recommend to initiate po intake with regular consistency/thin liquids with aspiration precautions (upright, slow rate, small bites, small sips). SLP will continue to follow for completion of a  communication evaluation. SLP discussed above with Pt and Pts family who were in agreement of recommendations/plan. SLP answered questions parents had at bedside. SLP discussed above with RN.          RECOMMENDATIONS   Diet Recommendations - Solids: Regular  Diet Recommendations - Liquids: Thin Liquids                           Aspiration Precautions: Slow rate, Small bites, Upright position, Small sips  Medication Administration Recommendations: No restrictions  Treatment Plan/Recommendations: Communication evaluation    HISTORY   MEDICAL HISTORY       Problem List  Principal Problem:    Acute subdural hematoma (HCC)  Active Problems:    Epidural hematoma (HCC)    Closed depressed fracture of skull, initial encounter ([facility])    Altered mental status, unspecified altered mental status type    Traumatic left-sided intracerebral hemorrhage with loss of consciousness of 30 minutes or less (HCC)    Leukocytosis      Past Medical History  Past Medical History[1]    Prior Living Situation: Home with support  Diet Prior to Admission: Regular, Thin liquids  Precautions: Aspiration    Patient/Family Goals: none stated    SWALLOWING HISTORY  Current Diet Consistency: NPO  Dysphagia History: n/a  Imaging Results:   CT BRAIN 04/08/2025 16:50  CONCLUSION:       1. Comminuted left temporal bone fracture noted with depression of multiple fragments.  Fracture planes also extend into the bilateral greater sphenoid wings, floor of the left middle cranial fossa, sphenoid sinuses, sella, bilateral carotid canals,   bilateral temporomandibular joints. Soft tissue laceration in the adjacent left temporal scalp along with soft tissue swelling.      2. There is an extra-axial hemorrhage along the lateral left cerebral convexity, most pronounced along the left temporal lobe as well as the floor of the left middle cranial fossa measuring up to 10 mm in thickness.  This is most likely in the epidural   space.  There is mild local mass effect.       Critical results were discussed with Dr. Bedoya at 1648 hours on 4/8/2025. Critical results were read back.         LOCATION:  [facility]         Dictated by (CST): Hermelindo Taylor MD on 4/08/2025 at 4:42 PM       Finalized by (CST): Hermelindo Taylor MD on 4/08/2025 at 4:51 PM       CTA BRAIN 04/08/2025 22:32  CONCLUSION:       1. No evidence of acute arterial injury. No evidence of intracranial aneurysm, flow-limiting stenosis, or focal arterial occlusion.     2. No evidence of occlusion, dissection, or flow-limiting stenosis in the cervical vertebral or carotid arteries. No evidence of hemodynamically significant carotid stenosis by NASCET criteria.     3. Interval left pterional craniotomy changes noted.  There has been evacuation of the previously visualized epidural hematoma along the left cerebral convexity and extending into the left middle cranial fossa.  There is scattered minimal residual blood   products and postprocedural pneumocephalus seen.  There is decreased mass effect.  There is decreased depression of the previously visualized left temporal bone fracture fragments.  Surgical clips are seen in the left scalp along with soft tissue gas and   swelling along the left scalp.     4. Fracture planes are again noted extending into the bilateral greater sphenoid wings, floor of the left middle cranial fossa, sphenoid sinuses, sella, bilateral carotid canals, bilateral temporomandibular joints.       Please see above for further details.     LOCATION:  [facility]        Dictated by (CST): Hermelindo Taylor MD on 4/08/2025 at 10:51 PM       Finalized by (CST): Hermelindo Taylor MD on 4/08/2025 at 10:56 PM    SUBJECTIVE       OBJECTIVE   ORAL MOTOR EXAMINATION  Dentition: Functional, Natural  Symmetry: Within Functional Limits  Strength: Within Functional Limits  Tone: Within Functional Limits  Range of Motion: Within Functional Limits  Rate of Motion: Within Functional Limits    Voice Quality: Clear  Respiratory  Status: Unlabored  Consistencies Trialed: Thin liquids, Hard solid  Method of Presentation: Self presentation, Cup, Straw, Single sips  Patient Positioned: Upright, Midline (in bed)    Oral Phase of Swallow: Within Functional Limits                      Pharyngeal Phase of Swallow: Within Functional Limits           (Please note: Silent aspiration cannot be evaluated clinically. Videofluoroscopic Swallow Study is required to rule-out silent aspiration.)    Esophageal Phase of Swallow: No complaints consistent with possible esophageal involvement  Comments:               GOALS  Goal #1 Pt will participate in a communication evaluation.  In Progress     FOLLOW UP  Treatment Plan/Recommendations: Communication evaluation  Duration: 1 week  Follow Up Needed (Documentation Required): Yes  SLP Follow-up Date: 04/10/25    Thank you for your referral.   If you have any questions, please contact Amber García  Clinician   Spectra [phone]         [1]   Past Medical History:   Anxiety

## 2025-04-09 NOTE — CONSULTS
[facility]  Neurocritical Care Consult Note    Adina Jefferson Patient Status:  Inpatient    10/2/2000 MRN [id number]   Location [facility] 6NE-A Attending Mustapha Booker MD   Hosp Day # 1 PCP No primary care provider on file.       Reason for Consultation:   EDH    HPI:   Patient is a 24 year old female with a h/o anxiety and depression p/w traumatic L epidural hematoma . Pt collided with another player during softball game resulting in head trauma, noted to have AMS/n/v thus brought to ED where w/u revealed ct head with mult skull fractures and L epidural hematoma, thus taken to OR for emergent crani for evac, and pt was transferred to cnicu for further monitoring.     Past Medical History[1]    Past Surgical History[2]    Prescriptions Prior to Admission[3]  Allergies[4]  Social Hx on file[5]  Family History[6]    Current Meds:  Current Hospital Medications[7]    Review of Systems:     Constitutional:    Denies unusual weight loss or weight gain, fever/chills or night sweats.  HEENT:                Denies changes in vision or difficulty swallowing.  Pulm:                    Denies dyspnea, cough, or sputum production  Cardiac:               Denies chest pain, palpitations or lower extremity edema.  GI:                         Denies constipation, heartburn or melena.  :                       Denies dysuria or hematuria.  Skin:                     Denies rashes or open areas.  Neuro:                  As per HPI    All other systems were reviewed and are negative.    Vitals:   Temp:  [97.3 °F (36.3 °C)-98.8 °F (37.1 °C)] 98.8 °F (37.1 °C)  Pulse:  [] 68  Resp:  [10-18] 16  BP: (102-150)/(66-97) 102/67  SpO2:  [90 %-100 %] 95 %  AO: ()/(56-73) 91/56  Body mass index is 19.99 kg/m².    Intake/Output:    Intake/Output Summary (Last 24 hours) at 2025 0920  Last data filed at 2025 0700  Gross per 24 hour   Intake 2407.7 ml   Output 1460 ml   Net 947.7 ml       Physical  Examination:   General- No acute distress  CV- RRR  Resp- CTA Bilat  Neuro-  Mental status- awake and alert, regards and follows commands, oriented x3, speech fluent  Cranial nerves- pupils equally round and reactive to light, extraocular muscles intact, visual fields full  Motor- 5/5 throughout  Sens-  Intact to light touch    Diagnostics:   CTA BRAIN + CTA CAROTIDS (CPT=70496/13649)  Result Date: 4/8/2025  CONCLUSION:   1. No evidence of acute arterial injury. No evidence of intracranial aneurysm, flow-limiting stenosis, or focal arterial occlusion.  2. No evidence of occlusion, dissection, or flow-limiting stenosis in the cervical vertebral or carotid arteries. No evidence of hemodynamically significant carotid stenosis by NASCET criteria.  3. Interval left pterional craniotomy changes noted.  There has been evacuation of the previously visualized epidural hematoma along the left cerebral convexity and extending into the left middle cranial fossa.  There is scattered minimal residual blood products and postprocedural pneumocephalus seen.  There is decreased mass effect.  There is decreased depression of the previously visualized left temporal bone fracture fragments.  Surgical clips are seen in the left scalp along with soft tissue gas and  swelling along the left scalp.  4. Fracture planes are again noted extending into the bilateral greater sphenoid wings, floor of the left middle cranial fossa, sphenoid sinuses, sella, bilateral carotid canals, bilateral temporomandibular joints.   Please see above for further details.  LOCATION:  [facility]   Dictated by (CST): Hermelindo Taylor MD on 4/08/2025 at 10:51 PM     Finalized by (CST): Hermelindo Taylor MD on 4/08/2025 at 10:56 PM       CT FACIAL BONES (CPT=70486)  Result Date: 4/8/2025  CONCLUSION:   1. Redemonstration of comminuted fractures of the left temporal bone as well as the bilateral greater sphenoid wings.  Fracture planes extend into the bilateral sphenoid  sinuses where there are air-fluid levels.  Fracture planes extend into the bilateral  carotid canals as well as the floor of the left middle cranial fossa.  Fracture planes extend into the bilateral temporomandibular joints.  Fracture planes extend into the base of the sella turcica.  2. Partially imaged postoperative changes from recent left pterional craniotomy.  Postoperative gas in the left temporal scalp soft tissues along with soft tissue swelling.    LOCATION:  [facility]   Dictated by (CST): Hermelindo Taylor MD on 4/08/2025 at 10:34 PM     Finalized by (CST): Hermelindo Taylor MD on 4/08/2025 at 10:38 PM       US INTRAOPERATIVE GUIDANCE (CPT=76998)  Result Date: 4/8/2025  CONCLUSION:  Sonographic image(s) obtained and submitted during craniotomy procedure.  No radiologist was present for the exam.  Correlation with real-time sonography and operative report are recommended.   LOCATION:  [facility]    Dictated by ([facility]): Nate Harding MD on 4/08/2025 at 8:07 PM     Finalized by (CST): Nate Harding MD on 4/08/2025 at 8:08 PM       CT BRAIN OR HEAD (CPT=70450)  Result Date: 4/8/2025  CONCLUSION:   1. Comminuted left temporal bone fracture noted with depression of multiple fragments.  Fracture planes also extend into the bilateral greater sphenoid wings, floor of the left middle cranial fossa, sphenoid sinuses, sella, bilateral carotid canals, bilateral temporomandibular joints. Soft tissue laceration in the adjacent left temporal scalp along with soft tissue swelling.  2. There is an extra-axial hemorrhage along the lateral left cerebral convexity, most pronounced along the left temporal lobe as well as the floor of the left middle cranial fossa measuring up to 10 mm in thickness.  This is most likely in the epidural space.  There is mild local mass effect.  Critical results were discussed with Dr. Bedoya at 1648 hours on 4/8/2025. Critical results were read back.   LOCATION:  [facility]   Dictated by (CST): Hermelindo Taylor  MD on 4/08/2025 at 4:42 PM     Finalized by (CST): Hermelindo Taylor MD on 4/08/2025 at 4:51 PM       CT SPINE CERVICAL (CPT=72125)  Result Date: 4/8/2025  CONCLUSION:  No evidence of acute fracture of the cervical spine.  No significant bony central canal or neural foraminal stenosis is seen.  If there is persistent clinical concern then recommend follow-up imaging with MRI.    LOCATION:  [place] [id number]   Dictated by (CST): Quoc Zimmerman MD on 4/08/2025 at 4:42 PM     Finalized by (CST): Quoc Zimmerman MD on 4/08/2025 at 4:45 PM         Lab Review     Recent Labs   Lab 04/08/25 1621 04/08/25 2117 04/09/25 0417    139 141   K 3.3* 4.3 4.3    109 109   CO2 25.0 21.0 24.0   * 150* 127*   BUN 15 13 11   CREATSERUM 1.04* 0.84 0.76     Recent Labs   Lab 04/08/25 1621 04/08/25 2117 04/09/25 0417   WBC 12.6* 22.7* 16.3*   HGB 12.6 11.2* 10.4*   .0 209.0 182.0       Assesment/Plan:     Neuro:  L hemispheric EDH- traumatic 2/2 sports-related injury.   CTA neg for vascular injury.  Now s/p crani for evac, postop per Neurosurg.   Cont neurochecks/pt/ot/st.  Cont keppra for seizure prophylaxis x7d total.  Skull fractures- per NS and trauma surg  Cardiac:  sbp goal 100-150  Pulmonary:  Stable on RA  Renal:  IVFs, monitor BUN/Cr  GI:  PO as tolerated  Heme/ID:  Afebrile, trend leukocytosis  Endocrine/Rheum:  Monitor glucose, sliding scale insulin prn  DVT Prophylaxis:  SCD’s    Goals of the Day: neurochecks   Upon my evaluation, this patient had a high probability of imminent or life-threatening deterioration due to central nervous system failure and multiple injuries, significant trauma, which required my direct attention, intervention, and personal management.    I have personally provided 45 minutes of critical care time, exclusive of time spent on separately billable procedures.  Time includes review of all pertinent laboratory/radiology results, discussion with consultants, and monitoring for potential  decompensation.  Performed interventions included  OR for evac of ED .     Thank you for allowing me to participate in the care of this patient.     Namrata Araujo MD, [facility]  Medical Director of System Neurosciences  Chief, Section of Neurocritical Care  [facility]           [1]   Past Medical History:   Anxiety   [2] History reviewed. No pertinent surgical history.  [3]   Medications Prior to Admission   Medication Sig Dispense Refill Last Dose/Taking    sertraline 50 MG Oral Tab Take 1.5 tablets (75 mg total) by mouth in the morning.   4/8/2025 Morning    Norgestim-Eth Estrad Triphasic 0.18/0.215/0.25 MG-35 MCG Oral Tab    4/7/2025   [4] No Known Allergies  [5]   Social History  Socioeconomic History    Marital status: Single   Tobacco Use    Smoking status: Never    Smokeless tobacco: Never   [6] No family history on file.  [7]   Current Facility-Administered Medications   Medication Dose Route Frequency    HYDROmorphone (Dilaudid) 1 MG/ML injection 0.2 mg  0.2 mg Intravenous Q2H PRN    Or    HYDROmorphone (Dilaudid) 1 MG/ML injection 0.4 mg  0.4 mg Intravenous Q2H PRN    Or    HYDROmorphone (Dilaudid) 1 MG/ML injection 0.8 mg  0.8 mg Intravenous Q2H PRN    melatonin tab 3 mg  3 mg Oral Nightly PRN    meperidine (Demerol) 25 MG/ML injection 12.5 mg  12.5 mg Intravenous PRN    sodium chloride 0.9% infusion   Intravenous Continuous    oxyCODONE immediate release tab 2.5 mg  2.5 mg Oral Q4H PRN    Or    oxyCODONE immediate release tab 5 mg  5 mg Oral Q4H PRN    pantoprazole (Protonix) 40 mg in sodium chloride 0.9% PF 10 mL IV push  40 mg Intravenous QAM AC    Or    pantoprazole (Protonix) DR tab 40 mg  40 mg Oral QAM AC    acetaminophen (Tylenol) tab 650 mg  650 mg Oral Q4H PRN    Or    acetaminophen (Tylenol) rectal suppository 650 mg  650 mg Rectal Q4H PRN    labetalol (Trandate) 5 mg/mL injection 10 mg  10 mg Intravenous Q10 Min PRN    hydrALAzine (Apresoline) 20 mg/mL  injection 10 mg  10 mg Intravenous Q2H PRN    ondansetron (Zofran) 4 MG/2ML injection 4 mg  4 mg Intravenous Q6H PRN    Or    metoclopramide (Reglan) 5 mg/mL injection 10 mg  10 mg Intravenous Q8H PRN    niCARdipine in sodium chloride 0.86% (carDENE) 20 mg/200mL infusion premix  5-15 mg/hr Intravenous Continuous PRN    polyethylene glycol (PEG 3350) (Miralax) 17 g oral packet 17 g  17 g Oral Daily PRN    sennosides (Senokot) tab 17.2 mg  17.2 mg Oral Nightly PRN    bisacodyl (Dulcolax) 10 MG rectal suppository 10 mg  10 mg Rectal Daily PRN    fleet enema (Fleet) rectal enema 133 mL  1 enema Rectal Once PRN    levETIRAcetam (Keppra) 500 mg/5mL injection 500 mg  500 mg Intravenous Q12H

## 2025-04-09 NOTE — PROGRESS NOTES
[facility]  Progress Note    Adina Jefferson Patient Status:  Inpatient    10/2/2000 MRN [id number]   Location [facility] 6NE-A Attending Mustapha Booker MD   Hosp Day # 1 PCP No primary care provider on file.     Subjective:  Patient seen resting in chair next to bed with father and boyfriend present.  She endorses headache but states pain medication is somewhat helping.  Patient is tolerating diet with nausea.  She denies vomiting.  She denies abdominal/pelvic pain. She denies passing flatus or having bowel movements.    Objective/Physical Exam:  General: Awake, fatigued. Oriented to person, place, time.  Cooperative.  No apparent distress.  Vital Signs:  Blood pressure 103/71, pulse 69, temperature 99 °F (37.2 °C), temperature source Temporal, resp. rate 14, height 67\", weight 127 lb 10.3 oz (57.9 kg), SpO2 95%.  Wt Readings from Last 3 Encounters:   25 127 lb 10.3 oz (57.9 kg)     HEENT: Normocephalic. No scleral icterus.  Lungs: Unlabored and equal. No respiratory distress.  Abdomen:  Soft, non distended, non tender to palpation, with no rebound or guarding.  Incision: Left cranial incision is covered with a protective, non-saturated dressing.  Neuro: Motor and sensory intact throughout.  Skin: Warm, dry. No erythema, ecchymosis, rashes. No jaundice.    Intake/Output:    Intake/Output Summary (Last 24 hours) at 2025 1455  Last data filed at 2025 1400  Gross per 24 hour   Intake 2407.7 ml   Output 2385 ml   Net 22.7 ml     I/O last 3 completed shifts:  In: 2407.7 [I.V.:2407.7]  Out: 1445 [Urine:1285; Drains:110; Blood:50]  I/O this shift:  In: -   Out: 940 [Urine:940]    Medications: Scheduled Medications[1]    Labs:  Lab Results   Component Value Date    WBC 16.3 2025    HGB 10.4 2025    HCT 31.1 2025    .0 2025     Lab Results   Component Value Date     2025    K 4.3 2025     2025    CO2 24.0 2025    BUN 11 2025     CREATSERUM 0.76 04/09/2025     04/09/2025    CA 7.7 04/09/2025    ALKPHO 59 04/08/2025    ALT 10 04/08/2025    AST 24 04/08/2025    BILT 0.3 04/08/2025    ALB 4.6 04/08/2025    TP 7.1 04/08/2025     Lab Results   Component Value Date    INR 1.05 04/08/2025         Assessment  Problem List[2]    Head trauma -- POD 1 left craniotomy for evacuation of epidural hematoma, elevation of depressed skull fracture    Plan:  No acute surgical intervention at this time.  Continue diet as tolerated.  Continue analgesics and antiemetics as needed.  Ambulate and up to chair.  Drain care per neurosurgery.  Appreciate neurosurgery and rehab recommendations.  Medical management per primary.  Seizure prophylaxis with Keppra.  DVT prophylaxis with Protonix.  GI prophylaxis with SCDs.  General surgery plans to sign off at this time.  If necessary, please do not hesitate to reconsult.  Follow-up with [facility] general surgery as needed.      Quality:  DVT Mechanical Prophylaxis:   SCDs,    DVT Pharmacologic Prophylaxis   Medication   None                Code Status: Full Code  Agarwal: Agarwal catheter in place  Agarwal Duration (in days): 2  Central line:    BRITNEY:         ADOLFO Park  4/9/2025  2:55 PM      The patient was discussed with Dr. Leslie MD.           [1]    sertraline  75 mg Oral Daily    levETIRAcetam  500 mg Oral BID    pantoprazole  40 mg Intravenous QAM AC    Or    pantoprazole  40 mg Oral QAM AC   [2]   Patient Active Problem List  Diagnosis    Epidural hematoma (HCC)    Closed depressed fracture of skull, initial encounter (HCC)    Altered mental status, unspecified altered mental status type    Traumatic left-sided intracerebral hemorrhage with loss of consciousness of 30 minutes or less (HCC)    Leukocytosis

## 2025-04-09 NOTE — OPERATIVE REPORT
Neurosurgery operative report        Preoperative diagnosis:  1.  Left temporal epidural hematoma with brain compression and midline shift  2.  Left temporal comminuted depressed skull fracture  3.  Altered mental status      Postoperative diagnosis:  Same      Procedure performed:  1.  Left craniotomy for evacuation of epidural hematoma  2.  Elevation and open reduction of depressed skull fracture  3.  Use of intraoperative ultrasound with live interpretation of intraoperative imaging        Surgeon: Xiang Herrera MD        Assistant: KAREN Moon        Anesthesia: General endotracheal        Estimated blood loss: 50 cc        Indications for procedure:    Please also see the relevant progress notes for further information.  Briefly, this patient presented to the hospital following an injury during a softball game.  The patient had evidence of worse mental status in the emergency department than she had at the scene of the accident, as described in the initial consultation note.  The patient's parents were counseled regarding available surgical and nonsurgical treatment options.  Following a discussion of risks and benefits, they agreed to proceed with this operation on an emergent basis.        Procedure in detail:    The patient was reevaluated in the preoperative holding area.  The patient was reexamined.  The operation was reviewed, including risks, benefits, and alternatives.  Informed consent was verified.    The patient was then brought back to the operating theater.  A timeout was performed per protocol.  General endotracheal anesthesia was induced.  Lines and monitors were started by the anesthesiologist.    Patient was placed in a supine position on a standard operating table.  A roll was placed beneath the left shoulder, to keep her head in neutral position.  The head was placed on the [place] horseshoe.  Hair was removed with surgical clippers.  The scalp was cleansed with  isopropyl alcohol.  The incision was marked out.  DuraPrep was now applied followed by standard sterile surgical draping.    Another timeout was performed per protocol.  Lidocaine with epinephrine was injected.    Skin incision was made with a #10 scalpel blade.  Arterial hemostasis was achieved with bipolar electrocautery.  Heather clips were applied.  A myocutaneous flap was elevated.    Bur holes were placed near the keyhole, and at the root of the zygoma.  A sidecutting craniotome was then used to cut around the fractured portion of bone.  This was elevated and placed on the back table for repair and reimplantation.    Laceration was found on the middle meningeal artery.  This was coagulated with bipolar electrocautery.  The incision was irrigated copiously with saline solution.    The dura was found to be slack and pulsatile at all points.    Ultrasound was now brought onto the field.  Examination was carried out.  There was no evidence of significant subdural hematoma, contusion, or significant mass effect intradurally.  Therefore, the dura was not opened.    Meticulous extradural hemostasis was assured with bipolar electrocautery, Floseal, and Surgicel.    The bone flap was now examined.  The fracture was found to be significantly depressed.  This was reduced manually.  Several 8 hole plates were now applied across the fracture to preserve the reduction.  Bur hole covers and several dog bone plates were applied for reapplication.    The epidural space was irrigated copiously once again, and meticulous hemostasis was confirmed.    The bone flap was now reimplanted.  The incision was irrigated copiously with saline solution.  A Hemovac drain was brought out through a separate stab incision.    The incision was now closed in layers.  Temporalis muscle was closed with interrupted 2-0 Vicryl's.  Temporalis fascia was closed with interrupted 2-0 Vicryl's.  Galea was closed with interrupted inverted 2-0 Vicryl's.   Skin was closed with staples.  The drain was sutured in position.  A dressing was applied.  Drapes were removed.  The patient was now returned to a neutral, supine position.        Disposition: Cardiac neurosurgical intensive care unit    Condition: Stable, extubated, and neurologically improved from baseline    Counts: All needle, sponge, and cottonoid counts were reported correct at the end of the operation.    Complications: None    Wound classification: 1, clean    Implants: [place] cranial plating system    Specimen: None        This report was dictated using voice recognition technology.  Errors in syntax or recognition may occur, and should not be construed to change the meaning of a sentence.

## 2025-04-09 NOTE — PROGRESS NOTES
[facility]   [facility] Progress [facility]     Adina Jefferson Patient Status:  Inpatient    10/2/2000 MRN [id number]   Location [facility] 6NE-A Attending Mustapha Booker MD   Hosp Day # 1 PCP No primary care provider on file.     Chief Complaint: Head trauma     Subjective:     Patient is having some pain/headache to surgical site.  Denies n/v, no fever or chills, no cp or sob.  Denies weakness.  No new complaints.      Objective:    Review of Systems:   A comprehensive review of systems was completed; pertinent positive and negatives stated in subjective.    Vital signs:  Temp:  [97.3 °F (36.3 °C)-98.8 °F (37.1 °C)] 98.8 °F (37.1 °C)  Pulse:  [] 97  Resp:  [10-18] 14  BP: (106-150)/(66-97) 111/75  SpO2:  [90 %-100 %] 97 %  AO: (100-129)/(64-73) 100/64    Physical Exam:    General: No acute distress  HEENT:  Surgical incision noted, c/d/i  Respiratory: No wheezes, no rhonchi  Cardiovascular: S1, S2, regular rate and rhythm  Abdomen: Soft, Non-tender, non-distended, positive bowel sounds  Neuro: No new focal deficits.   Extremities: No edema    Diagnostic Data:    Labs:  Recent Labs   Lab 25  0417   WBC 12.6* 22.7* 16.3*   HGB 12.6 11.2* 10.4*   MCV 93.9 92.5 94.8   .0 209.0 182.0   INR 1.05  --   --        Recent Labs   Lab 25  0417   * 150* 127*   BUN 15 13 11   CREATSERUM 1.04* 0.84 0.76   CA 9.5 8.2* 7.7*   ALB 4.6  --   --     139 141   K 3.3* 4.3 4.3    109 109   CO2 25.0 21.0 24.0   ALKPHO 59  --   --    AST 24  --   --    ALT 10  --   --    BILT 0.3  --   --    TP 7.1  --   --        Estimated Glomerular Filtration Rate: 114 mL/min/1.73m2 (result from lab).    No results for input(s): \"TROP\", \"TROPHS\", \"CK\" in the last 168 hours.    Recent Labs   Lab 25  1621   PTP 13.8   INR 1.05                  Microbiology    No results found for this visit on  04/08/25.      Imaging: Reviewed in Epic.    Medications: Scheduled Medications[1]    Assessment & Plan:      #Left hemisphere Epidural Hematoma  S/p craniotomy withi evacuation   Neurosurgery and NeuroCC followinng  PT/OT/ST and frequent neurochecks  Keppra for Seizure precautions  Pain control    #Multiple skull fractures  Reviewed CT imaging   S/p sports injury   Neurosurgery and Gen surgery    #Anemia  Hg 11.2 -> 10.4 post procedure  No e/o of acute blood loss  Will trend CBC    #Leukocytosis  Likely reactive  Afebrile, monitor off abx at this time             Mustapha Booker MD    Supplementary Documentation:     Quality:  DVT Mechanical Prophylaxis:   SCDs,    DVT Pharmacologic Prophylaxis   Medication   None                Code Status: Full Code  Agarwal: Agarwal catheter in place  Agarwal Duration (in days): 2  Central line:    BRITNEY:     Discharge is dependent on: surgery recovery   At this point Ms. Jefferson is expected to be discharge to: Home    The 21st Century Cures Act makes medical notes like these available to patients in the interest of transparency. Please be advised this is a medical document. Medical documents are intended to carry relevant information, facts as evident, and the clinical opinion of the practitioner. The medical note is intended as peer to peer communication and may appear blunt or direct. It is written in medical language and may contain abbreviations or verbiage that are unfamiliar.                         [1]    pantoprazole  40 mg Intravenous QAM AC    Or    pantoprazole  40 mg Oral QAM AC    levETIRAcetam  500 mg Intravenous Q12H

## 2025-04-09 NOTE — BRIEF OP NOTE
Pre-Operative Diagnosis: Epidural hematoma (HCC) [S06.4XAA]     Post-Operative Diagnosis: Epidural hematoma (HCC) [S06.4XAA]      Procedure Performed:   LEFT CRANIOTOMY FOR EVACUATION OF EPIDURAL HEMATOMA, ELEVATION OF DEPRESSED SKULL FRACTURE    Surgeons and Role:     * Xiang Herrera MD - Primary    Assistant(s):  Surgical Assistant.: Paolo Perea          Estimated Blood Loss: Blood Output: 50 mL (4/8/2025  8:01 PM)        Xiang Herrera MD  4/8/2025  8:28 PM

## 2025-04-09 NOTE — CONSULTS
[facility]  Report of  Surgical Consultation with History and Physical Exam    Adina Jefferson Patient Status:  Inpatient    10/2/2000 MRN [id number]   Location [facility] 6NE-A Attending Xiang Herrera,*   Hosp Day # 0 PCP No primary care provider on file.     Referring Provider:   Oren Bedoya MD     Reason for Surgical Consultation:  Trauma Level 2     History of Present Illness:  Adina Jefferson is a 24 year old female who sustained a head trauma this afternoon while playing softball. The HPI was mostly obtained through chart review and from the patient. Patient was sliding into a base when she made contact with another player. Reports trauma to the head only . Positive loss of consciousness on the scene that quickly regained.     History:  Past Medical History:    Anxiety     History reviewed. No pertinent surgical history.  No family history on file.   reports that she has never smoked. She has never used smokeless tobacco.    Allergies:  Allergies[1]    Medications:    Current Facility-Administered Medications:     melatonin tab 3 mg, 3 mg, Oral, Nightly PRN    prochlorperazine (Compazine) 10 MG/2ML injection 5 mg, 5 mg, Intravenous, Q8H PRN    lactated ringers IV bolus 500 mL, 500 mL, Intravenous, Once PRN    naloxone (Narcan) 0.4 MG/ML injection 0.08 mg, 0.08 mg, Intravenous, PRN    meperidine (Demerol) 25 MG/ML injection 12.5 mg, 12.5 mg, Intravenous, PRN    fentaNYL (Sublimaze) 50 mcg/mL injection 25 mcg, 25 mcg, Intravenous, Q5 Min PRN **OR** fentaNYL (Sublimaze) 50 mcg/mL injection 50 mcg, 50 mcg, Intravenous, Q5 Min PRN    HYDROmorphone (Dilaudid) 1 MG/ML injection 0.2 mg, 0.2 mg, Intravenous, Q5 Min PRN **OR** HYDROmorphone (Dilaudid) 1 MG/ML injection 0.4 mg, 0.4 mg, Intravenous, Q5 Min PRN **OR** HYDROmorphone (Dilaudid) 1 MG/ML injection 0.6 mg, 0.6 mg, Intravenous, Q5 Min PRN    sodium chloride 0.9% infusion, , Intravenous, Continuous    oxyCODONE immediate release tab 2.5 mg, 2.5  mg, Oral, Q4H PRN **OR** oxyCODONE immediate release tab 5 mg, 5 mg, Oral, Q4H PRN    [START ON 4/9/2025] pantoprazole (Protonix) 40 mg in sodium chloride 0.9% PF 10 mL IV push, 40 mg, Intravenous, QAM AC **OR** [START ON 4/9/2025] pantoprazole (Protonix) DR tab 40 mg, 40 mg, Oral, QAM AC    levETIRAcetam (Keppra) 500 mg/5mL injection 500 mg, 500 mg, Intravenous, Q12H    acetaminophen (Tylenol) tab 650 mg, 650 mg, Oral, Q4H PRN **OR** acetaminophen (Tylenol) rectal suppository 650 mg, 650 mg, Rectal, Q4H PRN    labetalol (Trandate) 5 mg/mL injection 10 mg, 10 mg, Intravenous, Q10 Min PRN    hydrALAzine (Apresoline) 20 mg/mL injection 10 mg, 10 mg, Intravenous, Q2H PRN    ondansetron (Zofran) 4 MG/2ML injection 4 mg, 4 mg, Intravenous, Q6H PRN **OR** metoclopramide (Reglan) 5 mg/mL injection 10 mg, 10 mg, Intravenous, Q8H PRN    niCARdipine in sodium chloride 0.86% (carDENE) 20 mg/200mL infusion premix, 5-15 mg/hr, Intravenous, Continuous PRN    polyethylene glycol (PEG 3350) (Miralax) 17 g oral packet 17 g, 17 g, Oral, Daily PRN    sennosides (Senokot) tab 17.2 mg, 17.2 mg, Oral, Nightly PRN    bisacodyl (Dulcolax) 10 MG rectal suppository 10 mg, 10 mg, Rectal, Daily PRN    fleet enema (Fleet) rectal enema 133 mL, 1 enema, Rectal, Once PRN    Review of Systems:  Pertinent items are noted in HPI.  The review of systems was negative other than the above listed history of present illness and past medical history including the HEENT, Heart, Lungs, GI, , Neuro, Musculoskeletal, Hematologic, Endocrine and Psych.     Physical Exam:  Blood pressure 150/88, pulse 79, temperature 97.3 °F (36.3 °C), temperature source Temporal, resp. rate 14, height 67\", weight 127 lb 10.3 oz (57.9 kg), SpO2 95%.    General: Alert, orientated x3.  Cooperative. Somnolent.  Not in apparent distress.    HEENT: Head with clean dressing, drain in place with moderate amount of bloody output. Without scleral icterus.  Mucous membranes are moist.  EOM are WNL.     Neuro:  Motor and sensory intact throughout, CN within normal limits    Neck: No tenderness to palpitation.  Full range of motion to flexion and extension, lateral rotation and lateral flexion of cervical spine.  No JVD. Supple.     Lungs: Bilateral chest rise. Good excursion of the diaphragms. No secondary use of accessory respiratory musculature.    Cardiac: Regular rate and rhythm. No murmur.    Abdomen:  soft, non tender, non distended, no rebound or guarding    Pelvis: stable , non tender    Spine: non tender, no step offs or deformities, cspine in collar    Extremities:  No lower extremity edema noted.  Without clubbing or cyanosis.  No signs of trauma     Skin: Normal texture and turgor.      Laboratory Data and Relevant X-rays:  Recent Labs   Lab 04/08/25  1621   RBC 3.94   HGB 12.6   HCT 37.0   MCV 93.9   MCH 32.0   MCHC 34.1   RDW 11.0   NEPRELIM 7.30   WBC 12.6*   .0       Recent Labs   Lab 04/08/25  1621   *   BUN 15   CREATSERUM 1.04*   CA 9.5   ALB 4.6      K 3.3*      CO2 25.0   ALKPHO 59   AST 24   ALT 10   BILT 0.3   TP 7.1         Recent Labs   Lab 04/08/25  1621   PTP 13.8   INR 1.05   PTT 21.9*         Impression/Plan:  Patient Active Problem List   Diagnosis    Acute subdural hematoma (HCC)    Epidural hematoma (HCC)    Closed depressed fracture of skull, initial encounter ([facility])    Altered mental status, unspecified altered mental status type       24 year old female who sustained a blunt head trauma. CT scan of the head showed epidural hemorrhage with a depressed skull fracture. She was taken emergently by dr. Herrera for evacuation of the epidural hematoma and elevation of the depressed skull fracture. CT c spine does not show any acute injury. No other signs of trauma on thorough exam   Patient will go for CTA head and neck to evaluate for any BSVI . Will obtain a CT of the facial bones to assess for any other facial fractures.   Patient in the neuro  ICU at this time, appreciate their management. Rest of care per Neuro critical care and Neurosurgery. Q1 hour neurochecks, SBP goal < 140.         Charmaine Nelson MD  4/8/2025  9:36 PM         [1] No Known Allergies

## 2025-04-09 NOTE — PROGRESS NOTES
[facility]   NEUROCRITICAL CARE   APRN PROGRESS NOTE    Admission date: 2025  Reason for Consult: ICH  Chief Complaint:   Chief Complaint   Patient presents with    Trauma 1 & 2    Head Neck Injury       History     NAME: Adina Jefferson - ROOM: 6601/6601-A - MRN: [id number] - Age: 24 year old - :10/2/2000    History Of Present Illness:  Adina Jefferson is a 24 year old female with no significant PMHx who presented to ED with AMS after head trauma while playing softball.  She was diving for a ball and collided with another player.  She had LOC then had a period alertness with orientation per patient's father. However on presentation to ED she was only oriented to self and did not recognize her parents.  She had emesis at the scene and in route.  CT showed a comminuted depressed left temporal skull fracture with underlying epidural hematoma measuring up to 10 mm and local mass effect.  She was taken urgently to the OR for left craniotomy, evacuation of epidural hematoma, and elevation of depressed skull fracture.  Post op she admitted to CNICU.  She is lethargic but following commands equally and after around an hour postop is oriented x4.      Past Medical History:  Past Medical History:    Anxiety     Past Surgical History:   History reviewed. No pertinent surgical history.   Family History:   No family history on file.  Social History:    reports that she has never smoked. She has never used smokeless tobacco.     Review of Systems:   A comprehensive 10 point review of systems was completed.  Pertinent positives and negatives noted in the HPI.    Current Facility-Administered Medications   Medication Dose Route Frequency    melatonin tab 3 mg  3 mg Oral Nightly PRN    ondansetron (Zofran) 4 MG/2ML injection 4 mg  4 mg Intravenous Q6H PRN    prochlorperazine (Compazine) 10 MG/2ML injection 5 mg  5 mg Intravenous Q8H PRN    lactated ringers IV bolus 500 mL  500 mL Intravenous Once PRN     acetaminophen (Tylenol Extra Strength) tab 1,000 mg  1,000 mg Oral Once PRN    Or    HYDROcodone-acetaminophen (Norco) 5-325 MG per tab 1 tablet  1 tablet Oral Once PRN    Or    HYDROcodone-acetaminophen (Norco) 5-325 MG per tab 2 tablet  2 tablet Oral Once PRN    atropine 0.1 MG/ML injection 0.5 mg  0.5 mg Intravenous PRN    labetalol (Trandate) 5 mg/mL injection 5 mg  5 mg Intravenous Q5 Min PRN    naloxone (Narcan) 0.4 MG/ML injection 0.08 mg  0.08 mg Intravenous PRN    meperidine (Demerol) 25 MG/ML injection 12.5 mg  12.5 mg Intravenous PRN    fentaNYL (Sublimaze) 50 mcg/mL injection 25 mcg  25 mcg Intravenous Q5 Min PRN    Or    fentaNYL (Sublimaze) 50 mcg/mL injection 50 mcg  50 mcg Intravenous Q5 Min PRN    HYDROmorphone (Dilaudid) 1 MG/ML injection 0.2 mg  0.2 mg Intravenous Q5 Min PRN    Or    HYDROmorphone (Dilaudid) 1 MG/ML injection 0.4 mg  0.4 mg Intravenous Q5 Min PRN    Or    HYDROmorphone (Dilaudid) 1 MG/ML injection 0.6 mg  0.6 mg Intravenous Q5 Min PRN    prochlorperazine (Compazine) 10 MG/2ML injection 5 mg  5 mg Intravenous Q8H PRN    sodium chloride 0.9% infusion   Intravenous Continuous    oxyCODONE immediate release tab 2.5 mg  2.5 mg Oral Q4H PRN    Or    oxyCODONE immediate release tab 5 mg  5 mg Oral Q4H PRN    niCARdipine in sodium chloride 0.86% (carDENE) 20 mg/200mL infusion premix  5-15 mg/hr Intravenous Continuous PRN    [START ON 4/9/2025] pantoprazole (Protonix) 40 mg in sodium chloride 0.9% PF 10 mL IV push  40 mg Intravenous QAM AC    Or    [START ON 4/9/2025] pantoprazole (Protonix) DR tab 40 mg  40 mg Oral QAM AC    levETIRAcetam (Keppra) 500 mg/5mL injection 500 mg  500 mg Intravenous Q12H     OBJECTIVE   VITAL SIGNS:   Temp:  [97.3 °F (36.3 °C)-98 °F (36.7 °C)] 97.3 °F (36.3 °C)  Pulse:  [] 79  Resp:  [10-18] 14  BP: (111-150)/(67-97) 150/88  SpO2:  [90 %-100 %] 95 %  AO: (128)/(67) 128/67    VENT STATUS:        INTAKE/OUTPUT:    Intake/Output Summary (Last 24 hours) at  4/8/2025 2112  Last data filed at 4/8/2025 2032  Gross per 24 hour   Intake --   Output 450 ml   Net -450 ml       PHYSICAL EXAM:  GENERAL APPEARANCE: Patient resting comfortably in bed, NAD, c/o frontal headache and photophobia  HEENT: Normocephalic, atraumatic.   LUNGS: Normal respiratory rate and effort   HEART: Regular rate and rhythm   ABDOMEN: Soft. No tenderness, guarding, or rebound.   EXTREMITIES: No cyanosis, clubbing, or edema.  SKIN: Warm, dry, and well perfused.  PSYCH: Mood stable, affect normal      NEUROLOGIC:    Mental Status:  A&O x 4, Follows simple commands, no obvious aphasia or dysarthria  Cranial nerves: PERRL.  Visual fields full.  EOMI.  Face symmetric with normal movement bilaterally.  Hearing grossly intact. Tongue midline with normal movements.   Motor: Drift:  Absent; Motor exam is 5 out of 5 in all extremities bilaterally  Sensation: Intact to light touch bilaterally, reports slightly decreased sensation to left face near surgical site    LABORATORY DATA:  Last 24 hour labs were reviewed in detail.  Recent Labs   Lab 04/08/25  1621      K 3.3*      CO2 25.0   *   BUN 15   CREATSERUM 1.04*     Recent Labs   Lab 04/08/25  1621   WBC 12.6*   HGB 12.6   .0     Recent Labs   Lab 04/08/25  1621   ALT 10   AST 24     No results for input(s): \"MG\", \"PHOS\" in the last 168 hours.    Radiology:    US INTRAOPERATIVE GUIDANCE (CPT=76998)    Result Date: 4/8/2025  CONCLUSION:  Sonographic image(s) obtained and submitted during craniotomy procedure.  No radiologist was present for the exam.  Correlation with real-time sonography and operative report are recommended.   LOCATION:  [facility]    Dictated by (CST): Nate Harding MD on 4/08/2025 at 8:07 PM     Finalized by (CST): Nate Harding MD on 4/08/2025 at 8:08 PM       CT BRAIN OR HEAD (CPT=70450)    Result Date: 4/8/2025  CONCLUSION:   1. Comminuted left temporal bone fracture noted with depression of multiple fragments.   Fracture planes also extend into the bilateral greater sphenoid wings, floor of the left middle cranial fossa, sphenoid sinuses, sella, bilateral carotid canals, bilateral temporomandibular joints. Soft tissue laceration in the adjacent left temporal scalp along with soft tissue swelling.  2. There is an extra-axial hemorrhage along the lateral left cerebral convexity, most pronounced along the left temporal lobe as well as the floor of the left middle cranial fossa measuring up to 10 mm in thickness.  This is most likely in the epidural space.  There is mild local mass effect.  Critical results were discussed with Dr. Bedoya at 1648 hours on 4/8/2025. Critical results were read back.   LOCATION:  [facility]   Dictated by (CST): Hermelindo Taylor MD on 4/08/2025 at 4:42 PM     Finalized by (CST): Hermelindo Taylor MD on 4/08/2025 at 4:51 PM       CT SPINE CERVICAL (CPT=72125)    Result Date: 4/8/2025  CONCLUSION:  No evidence of acute fracture of the cervical spine.  No significant bony central canal or neural foraminal stenosis is seen.  If there is persistent clinical concern then recommend follow-up imaging with MRI.    LOCATION:  [id number]   Dictated by (CST): Quoc Zimmerman MD on 4/08/2025 at 4:42 PM     Finalized by (CST): Quoc Zimmerman MD on 4/08/2025 at 4:45 PM      ASSESSMENT/PLAN   24 year old female here with traumatic ICH s/p craniotomy, evacuation of epidural hematoma and elevation of depressed skull fracture.    Neurological:  Intracranial hemorrhage due to trauma s/p left craniotomy, evacuation of epidural hematoma, and elevation of depressed skull fracture, PBD 0   - CT Head as above   - CTA head and neck with multiple fractures, no vascular abnormalities   - CT facial bones reviewed   - Coags normal     - Goal Plt > 100, INR < 1.5    - SBP goal < 140    - Keppra BID   - Seizure precautions   - Monitor hemovac drainage   - Bedrest   - C-collar to remain in place tonight   - PRN pain medication   - Neuro checks  Q1h   - Nsg following, appreciate recs    - PT/OT/SLP consulted    Cardiovascular:   -Monitor on telemetry    Pulmonary:         - Saturating well on 2L NC, encourage IS     Renal:         - Monitor I&Os          - IVF while NPO    - Agarwal in place - likely remove tomorrow    Gastrointestinal:  Nutrition:        - NPO         - Bowel regimen: ordered prn    Infectious Disease:   Leukocytosis:   -Likely reactive   -Monitor off antibiotics    Heme/Onc - Hb goal > 7, continue to monitor daily     Endocrine:   -Monitor accu-checks Q6h while NPO    MSK:  Head trauma:   -CT spine without abnormality   -CT facial bones - comminuted fractures of the left temporal bone, bilateral greater sphenoid wings with fracture planes extending into bilateral sphenoid sinuses and bilateral carotic canals and floor of left cranial fossa.  Fractures to bilateral temporomandibular joints and extend to base of the sella turcica   -CTA head and neck without signs of vascular abnormality   -Continue C-collar until cleared by NS   -PRN pain medication   -Evaluated by trauma surgeon, appreciate recs    Checklist   - Lines/drains: PIVs, hemovac at surgical site, Agarwal   - DVT Prophylaxis: SCDs    - Diet: NPO   - IVF: NS   - Electrolytes: Replete per protocol   - Code Status: FULL    Dispo: CNICU    Plan of care discussed with Neuro-Critical Care Intensivist, Dr. Araujo.    Upon my evaluation, this patient had a high probability of imminent or life-threatening deterioration due to critical findings of laboratory tests, critical findings of imaging, and multiple injuries, significant trauma to head, epidural hematoma, which required my direct attention, intervention, and personal management.    I have personally provided 45 minutes of critical care time, exclusive of time spent on separately billable procedures.  Time includes review of all pertinent laboratory/radiology results, discussion with consultants, and monitoring for potential  decompensation.  Performed interventions included fluids, neuro monitoring, extensive discussion with family.    TOD Oconnell  Neurocritical Care  [facility]    Disclaimer: This record was dictated using Dragon software. There may be errors due to voice recognition problems that were not realized and corrected during the completion of the note.

## 2025-04-09 NOTE — PLAN OF CARE
Received pt from OR around 2104. Received pt on 6L NC, weaned to RA. Maintaining SpO2 > 92%. NQ1H. Pt lethargic and progressively more alert as each assessment goes on. Follows commands, CHAPMAN, sensation intact. See Complex Neuro Assessment for more details. L head incision covered by dressing. Upon arrival from OR, some drainage on dressing. Dressing was marked and no increase in size noted. Hemovac in place w/ bright red output. C-collar in place. NSR on tele, maintaining SBP < 140. Agarwal catheter in place, adequate UO. PRN Dilaudid given for L head incision site pain with mild relief. Family at bedside. Pt and family updated on plan of care.       Problem: NEUROLOGICAL - ADULT  Goal: Achieves stable or improved neurological status  Description: INTERVENTIONS- Assess for and report changes in neurological status- Initiate measures to prevent increased intracranial pressure- Maintain blood pressure and fluid volume within ordered parameters to optimize cerebral perfusion and minimize risk of hemorrhage- Monitor temperature, glucose, and sodium. Initiate appropriate interventions as ordered  4/9/2025 0045 by Doni Gooden RN  Outcome: Progressing  4/9/2025 0044 by Doni Gooden RN  Outcome: Progressing  Goal: Absence of seizures  Description: INTERVENTIONS- Monitor for seizure activity- Administer anti-seizure medications as ordered- Monitor neurological status  4/9/2025 0045 by Doni Gooden RN  Outcome: Progressing  4/9/2025 0044 by Doni Gooden RN  Outcome: Progressing  Goal: Remains free of injury related to seizure activity  Description: INTERVENTIONS:- Maintain airway, patient safety  and administer oxygen as ordered- Monitor patient for seizure activity, document and report duration and description of seizure to MD/LIP- If seizure occurs, turn patient to side and suction secretions as needed- Reorient patient post seizure- Seizure pads on all 4 side rails- Instruct  patient/family to notify RN of any seizure activity- Instruct patient/family to call for assistance with activity based on assessment  4/9/2025 0045 by Doni Gooden RN  Outcome: Progressing  4/9/2025 0044 by Doni Gooden RN  Outcome: Progressing  Goal: Achieves maximal functionality and self care  Description: INTERVENTIONS- Monitor swallowing and airway patency with patient fatigue and changes in neurological status- Encourage and assist patient to increase activity and self care with guidance from PT/OT- Encourage visually impaired, hearing impaired and aphasic patients to use assistive/communication devices  4/9/2025 0045 by Doni Gooden RN  Outcome: Progressing  4/9/2025 0044 by Doni Gooden RN  Outcome: Progressing     Problem: PAIN - ADULT  Goal: Verbalizes/displays adequate comfort level or patient's stated pain goal  Description: INTERVENTIONS:- Encourage pt to monitor pain and request assistance- Assess pain using appropriate pain scale- Administer analgesics based on type and severity of pain and evaluate response- Implement non-pharmacological measures as appropriate and evaluate response- Consider cultural and social influences on pain and pain management- Manage/alleviate anxiety- Utilize distraction and/or relaxation techniques- Monitor for opioid side effects- Notify MD/LIP if interventions unsuccessful or patient reports new pain- Anticipate increased pain with activity and pre-medicate as appropriate  4/9/2025 0045 by Doni Gooden RN  Outcome: Progressing  4/9/2025 0044 by Doni Gooden RN  Outcome: Progressing

## 2025-04-09 NOTE — PHYSICAL THERAPY NOTE
PHYSICAL THERAPY EVALUATION - INPATIENT     Room Number: 6601/6601-A  Evaluation Date: 4/9/2025  Type of Evaluation: Initial  Physician Order: PT Eval and Treat    Presenting Problem: Epidural Hematoma  Co-Morbidities : none  Reason for Therapy: Mobility Dysfunction and Discharge Planning    PHYSICAL THERAPY ASSESSMENT   Patient is a 24 year old female admitted 4/8/2025 for Epidural Hemtoma.  Prior to admission, patient's baseline is IND.  Patient is currently functioning below baseline with bed mobility, transfers, and gait.  Patient is requiring contact guard assist and minimal assist as a result of the following impairments: decreased endurance/aerobic capacity, pain, impaired gait balance, and decreased muscular endurance.  Physical Therapy will continue to follow for duration of hospitalization.    Patient will benefit from continued skilled PT Services at discharge to promote prior level of function.  Anticipate patient will return home with Day Rehab.    PLAN DURING HOSPITALIZATION  Nursing Mobility Recommendation : 1 Assist  PT Device Recommendation: Rolling walker  PT Treatment Plan: Bed mobility, Body mechanics, Gait training, Strengthening, Stair training, Transfer training, Balance training  Rehab Potential : Good  Frequency (Obs): 3-5x/week     CURRENT GOALS    Goal #1 Patient is able to demonstrate supine - sit EOB @ level: supervision     Goal #2 Patient is able to demonstrate transfers EOB to/from BSC at assistance level: supervision     Goal #3 Patient is able to ambulate 150 feet with assist device: walker - rolling at assistance level: supervision     Goal #4    Goal #5    Goal #6    Goal Comments: Goals established on 4/9/2025      PHYSICAL THERAPY MEDICAL/SOCIAL HISTORY  History related to current admission: Patient is a 24 year old female admitted on 4/8/2025 from Home for Epidural Hematoma.    HOME SITUATION  Type of Home: Other (Comment) (dorm room)  Home Layout: One level                      Lives With: Roommate    Drives: Yes          Prior Level of [place]: Pt states that she is IND with all ADLs and Gait    SUBJECTIVE  \"I feel better    OBJECTIVE  Precautions: Seizure, Drain(s)  Fall Risk: Standard fall risk    WEIGHT BEARING RESTRICTION     PAIN ASSESSMENT  Ratin  Location: Pt reports no pain       COGNITION  Overall Cognitive Status:  WFL - within functional limits    RANGE OF MOTION AND STRENGTH ASSESSMENT  Upper extremity ROM and strength are within functional limits     Lower extremity ROM is within functional limits     Lower extremity strength is within functional limits     BALANCE  Static Sitting: Fair +  Dynamic Sitting: Fair +  Static Standing: Fair  Dynamic Standing: Fair -    ADDITIONAL TESTS  Additional Tests: Modified [place]              Modified [place]: 2                  ACTIVITY TOLERANCE                         O2 WALK       NEUROLOGICAL FINDINGS                        AM-PAC '6-Clicks' INPATIENT SHORT FORM - BASIC MOBILITY  How much difficulty does the patient currently have...  Patient Difficulty: Turning over in bed (including adjusting bedclothes, sheets and blankets)?: A Little   Patient Difficulty: Sitting down on and standing up from a chair with arms (e.g., wheelchair, bedside commode, etc.): A Little   Patient Difficulty: Moving from lying on back to sitting on the side of the bed?: A Little   How much help from another person does the patient currently need...   Help from Another: Moving to and from a bed to a chair (including a wheelchair)?: A Little   Help from Another: Need to walk in hospital room?: A Little   Help from Another: Climbing 3-5 steps with a railing?: A Little     AM-PAC Score:  Raw Score: 18   Approx Degree of Impairment: 46.58%   Standardized Score (AM-PAC Scale): 43.63   CMS Modifier (G-Code): CK    FUNCTIONAL ABILITY STATUS  Gait Assessment   Functional Mobility/Gait Assessment  Gait Assistance: Minimum assistance  Distance (ft):  20  Assistive Device: Rolling walker  Pattern: Shuffle    Skilled Therapy Provided     Bed Mobility:  Rolling: NT  Supine to sit: Min A   Sit to supine: NT     Transfer Mobility:  Sit to stand: Min A   Stand to sit: Min A  Gait = Min A 20ft using RW    Therapist's Comments: Pt required to perform activity at a decrease speed as pt had increase headaches during position changes. Initiated gait using the RW. Pt was observed with no LOB but did demonstrate a slow alicia with increase stance time.    Exercise/Education Provided:  Bed mobility  Body mechanics  Gait training  Transfer training    Patient End of Session: Up in chair, Needs met, Call light within reach, RN aware of session/findings, All patient questions and concerns addressed, Family present      Patient Evaluation Complexity Level:  History Moderate - 1 or 2 personal factors and/or co-morbidities   Examination of body systems Low -  addressing 1-2 elements   Clinical Presentation Low- Stable   Clinical Decision Making Low Complexity       PT Session Time: 23 minutes  Therapeutic Activity: 15 minutes

## 2025-04-09 NOTE — PROGRESS NOTES
[facility]  Neurosurgery Progress Note    Adina Jefferson Patient Status:  Inpatient    10/2/2000 MRN [id number]   Location [facility] 6NE-A Attending Mustapha Booker MD   Hosp Day # 1 PCP No primary care provider on file.     Chief Complaint:  Epidural hematoma  Skull Fracture  AMS    POD #1 s/p left craniotomy for evacuation of epidural hematoma, elevation of depressed skull fracture    Subjective:  Doing well clinically. Pain controlled with meds. Plan for SLP this AM to clear for oral meds. Post op CT ok. CTA without vessel injury. Language improved.    Objective/Physical Exam:    Vital Signs:  Blood pressure 111/75, pulse 97, temperature 98.8 °F (37.1 °C), temperature source Temporal, resp. rate 14, height 67\", weight 127 lb 10.3 oz (57.9 kg), SpO2 97%.  Respiratory:  nonlabored  CV:  well perfused  General: NAD, Speech Fluent, Mood Appropriate  Neurologic:   A&Ox3  PERRL, EOMi, FS, TM  Full strength x 4, no drift  Skin: Dressings intact and dry, drain in place    Output by Drain (mL) 25 07 - 25 190 - 25 0659 25 07 - 25 18525 - 25 0659 25 07 - 25 0846   Closed Drain Left Scalp Accordion 10 Fr.    110          Labs:  Recent Labs   Lab 257   RBC 3.94 3.48* 3.28*   HGB 12.6 11.2* 10.4*   HCT 37.0 32.2* 31.1*   MCV 93.9 92.5 94.8   MCH 32.0 32.2 31.7   MCHC 34.1 34.8 33.4   RDW 11.0 11.2 11.0   NEPRELIM 7.30 20.88* 15.17*   WBC 12.6* 22.7* 16.3*   .0 209.0 182.0       Recent Labs   Lab 25  [id number]   * 150* 127*   BUN 15 13 11   CREATSERUM 1.04* 0.84 0.76   EGFRCR 78 101 114   CA 9.5 8.2* 7.7*    139 141   K 3.3* 4.3 4.3    109 109   CO2 25.0 21.0 24.0       Imaging:  CTA BRAIN + CTA CAROTIDS (CPT=70496/22261)  Result Date: 2025  CONCLUSION:   1. No evidence of acute arterial injury. No evidence of intracranial  aneurysm, flow-limiting stenosis, or focal arterial occlusion.  2. No evidence of occlusion, dissection, or flow-limiting stenosis in the cervical vertebral or carotid arteries. No evidence of hemodynamically significant carotid stenosis by NASCET criteria.  3. Interval left pterional craniotomy changes noted.  There has been evacuation of the previously visualized epidural hematoma along the left cerebral convexity and extending into the left middle cranial fossa.  There is scattered minimal residual blood products and postprocedural pneumocephalus seen.  There is decreased mass effect.  There is decreased depression of the previously visualized left temporal bone fracture fragments.  Surgical clips are seen in the left scalp along with soft tissue gas and  swelling along the left scalp.  4. Fracture planes are again noted extending into the bilateral greater sphenoid wings, floor of the left middle cranial fossa, sphenoid sinuses, sella, bilateral carotid canals, bilateral temporomandibular joints.   Please see above for further details.  LOCATION:  [facility]   Dictated by (CST): Hermelindo Taylor MD on 4/08/2025 at 10:51 PM     Finalized by (CST): Hermelindo Taylor MD on 4/08/2025 at 10:56 PM       CT FACIAL BONES (CPT=70486)  Result Date: 4/8/2025  CONCLUSION:   1. Redemonstration of comminuted fractures of the left temporal bone as well as the bilateral greater sphenoid wings.  Fracture planes extend into the bilateral sphenoid sinuses where there are air-fluid levels.  Fracture planes extend into the bilateral  carotid canals as well as the floor of the left middle cranial fossa.  Fracture planes extend into the bilateral temporomandibular joints.  Fracture planes extend into the base of the sella turcica.  2. Partially imaged postoperative changes from recent left pterional craniotomy.  Postoperative gas in the left temporal scalp soft tissues along with soft tissue swelling.    LOCATION:  [facility]   Dictated by  (CST): Hermelindo Taylor MD on 4/08/2025 at 10:34 PM     Finalized by (CST): Hermelindo Taylor MD on 4/08/2025 at 10:38 PM       US INTRAOPERATIVE GUIDANCE (CPT=76998)  Result Date: 4/8/2025  CONCLUSION:  Sonographic image(s) obtained and submitted during craniotomy procedure.  No radiologist was present for the exam.  Correlation with real-time sonography and operative report are recommended.   LOCATION:  [id number]    Dictated by (CST): Nate Harding MD on 4/08/2025 at 8:07 PM     Finalized by (CST): Nate Harding MD on 4/08/2025 at 8:08 PM       CT BRAIN OR HEAD (CPT=70450)  Result Date: 4/8/2025  CONCLUSION:   1. Comminuted left temporal bone fracture noted with depression of multiple fragments.  Fracture planes also extend into the bilateral greater sphenoid wings, floor of the left middle cranial fossa, sphenoid sinuses, sella, bilateral carotid canals, bilateral temporomandibular joints. Soft tissue laceration in the adjacent left temporal scalp along with soft tissue swelling.  2. There is an extra-axial hemorrhage along the lateral left cerebral convexity, most pronounced along the left temporal lobe as well as the floor of the left middle cranial fossa measuring up to 10 mm in thickness.  This is most likely in the epidural space.  There is mild local mass effect.  Critical results were discussed with Dr. Bedoya at 1648 hours on 4/8/2025. Critical results were read back.   LOCATION:  [facility]   Dictated by (CST): Hermelindo Taylor MD on 4/08/2025 at 4:42 PM     Finalized by (CST): Hermelindo Taylor MD on 4/08/2025 at 4:51 PM       CT SPINE CERVICAL (CPT=72125)  Result Date: 4/8/2025  CONCLUSION:  No evidence of acute fracture of the cervical spine.  No significant bony central canal or neural foraminal stenosis is seen.  If there is persistent clinical concern then recommend follow-up imaging with MRI.    LOCATION:  [id number]   Dictated by (CST): Quoc Zimmerman MD on 4/08/2025 at 4:42 PM     Finalized by (CST): Quoc Zimmerman  MD on 4/08/2025 at 4:45 PM            Assessment:  25 yo female who sustained a left epidural hematoma after an injury playing softball s/p craniotomy for hematoma evacuation.    Plan:  Medical management per NCC/ Hospitalist  OK to remove a-line, Doni martin to mobilize  C-spine cleared - no need for collar  Keppra x7 days  Pain meds PRN  Continue drain  Therapy evals  DVT prophylaxis SCD, Can start chemoprophlaxis 48 hours post surgery    Is this a shared or split note between Advanced Practice Provider and Physician? Yes       Sosa Judd, TOD  [facility]  4/9/2025, 8:43 AM  [facility]re [id number]    unknown

## 2025-04-09 NOTE — ANESTHESIA PROCEDURE NOTES
Peripheral IV  Date/Time: 4/8/2025 6:37 PM  Inserted by: Jb Luna MD    Placement  Needle size: 16 G  Laterality: left  Location: forearm  Site prep: alcohol  Technique: ultrasound guided  Attempts: 1

## 2025-04-09 NOTE — PLAN OF CARE
Pt has been neurologically intact and stable throughout the day.  Cervical collar removed by neurosurgery.  Neuro checks decreased to Q2h.  Left head incision with dressing in place, no further drainage noted.  Hemovac drain in place with minimal drainage.  Some facial swelling noted.  Pain management with iv dilaudid and oral oxycodone.   Radial arterial line and martin catheter removed.  Encouraging appetite/drinking.  Pt able to participate in therapy.  Up in the chair.    Family at bedside throughout the day.

## 2025-04-09 NOTE — CM/SW NOTE
Informed by Gretta Torrez and Gayle regarding need for transfer to [facility]--accepting physician--Dr Hernandez--information given to Mendez in transfer center

## 2025-04-10 LAB
ANION GAP SERPL CALC-SCNC: 7 MMOL/L (ref 0–18)
BUN BLD-MCNC: 8 MG/DL (ref 9–23)
CALCIUM BLD-MCNC: 8.7 MG/DL (ref 8.7–10.6)
CHLORIDE SERPL-SCNC: 108 MMOL/L (ref 98–112)
CO2 SERPL-SCNC: 28 MMOL/L (ref 21–32)
CREAT BLD-MCNC: 0.79 MG/DL (ref 0.55–1.02)
EGFRCR SERPLBLD CKD-EPI 2021: 108 ML/MIN/1.73M2 (ref 60–?)
ERYTHROCYTE [DISTWIDTH] IN BLOOD BY AUTOMATED COUNT: 11.3 %
GLUCOSE BLD-MCNC: 85 MG/DL (ref 70–99)
GLUCOSE BLD-MCNC: 90 MG/DL (ref 70–99)
HCT VFR BLD AUTO: 30.6 % (ref 35–48)
HGB BLD-MCNC: 10.2 G/DL (ref 12–16)
MCH RBC QN AUTO: 31.7 PG (ref 26–34)
MCHC RBC AUTO-ENTMCNC: 33.3 G/DL (ref 31–37)
MCV RBC AUTO: 95 FL (ref 80–100)
OSMOLALITY SERPL CALC.SUM OF ELEC: 294 MOSM/KG (ref 275–295)
PLATELET # BLD AUTO: 173 10(3)UL (ref 150–450)
POTASSIUM SERPL-SCNC: 4 MMOL/L (ref 3.5–5.1)
RBC # BLD AUTO: 3.22 X10(6)UL (ref 3.8–5.3)
SODIUM SERPL-SCNC: 143 MMOL/L (ref 136–145)
WBC # BLD AUTO: 13.5 X10(3) UL (ref 4–11)

## 2025-04-10 PROCEDURE — 99291 CRITICAL CARE FIRST HOUR: CPT | Performed by: INTERNAL MEDICINE

## 2025-04-10 PROCEDURE — 99232 SBSQ HOSP IP/OBS MODERATE 35: CPT | Performed by: HOSPITALIST

## 2025-04-10 NOTE — PLAN OF CARE
Assumed care of pt around 1930. NQ2H, intact. Left head incision w/ dressing in place. Intact with old drainage, no further drainage noted. Hemovac drain in place. Some facial swelling noted, especially around left eye. PRN oxycodone, dilaudid, and fioricet given for pain with some relief. Ambulating within room and up to bathroom with standby assist, tolerated well. Voiding in bathroom. Encouraging appetite and drinking. Family at bedside. Pt and family updated on plan of care.       Problem: NEUROLOGICAL - ADULT  Goal: Achieves stable or improved neurological status  Description: INTERVENTIONS- Assess for and report changes in neurological status- Initiate measures to prevent increased intracranial pressure- Maintain blood pressure and fluid volume within ordered parameters to optimize cerebral perfusion and minimize risk of hemorrhage- Monitor temperature, glucose, and sodium. Initiate appropriate interventions as ordered  Outcome: Progressing  Goal: Absence of seizures  Description: INTERVENTIONS- Monitor for seizure activity- Administer anti-seizure medications as ordered- Monitor neurological status  Outcome: Progressing  Goal: Remains free of injury related to seizure activity  Description: INTERVENTIONS:- Maintain airway, patient safety  and administer oxygen as ordered- Monitor patient for seizure activity, document and report duration and description of seizure to MD/LIP- If seizure occurs, turn patient to side and suction secretions as needed- Reorient patient post seizure- Seizure pads on all 4 side rails- Instruct patient/family to notify RN of any seizure activity- Instruct patient/family to call for assistance with activity based on assessment  Outcome: Progressing  Goal: Achieves maximal functionality and self care  Description: INTERVENTIONS- Monitor swallowing and airway patency with patient fatigue and changes in neurological status- Encourage and assist patient to increase activity and self care  with guidance from PT/OT- Encourage visually impaired, hearing impaired and aphasic patients to use assistive/communication devices  Outcome: Progressing     Problem: PAIN - ADULT  Goal: Verbalizes/displays adequate comfort level or patient's stated pain goal  Description: INTERVENTIONS:- Encourage pt to monitor pain and request assistance- Assess pain using appropriate pain scale- Administer analgesics based on type and severity of pain and evaluate response- Implement non-pharmacological measures as appropriate and evaluate response- Consider cultural and social influences on pain and pain management- Manage/alleviate anxiety- Utilize distraction and/or relaxation techniques- Monitor for opioid side effects- Notify MD/LIP if interventions unsuccessful or patient reports new pain- Anticipate increased pain with activity and pre-medicate as appropriate  Outcome: Progressing

## 2025-04-10 NOTE — PROGRESS NOTES
[facility]  Progress Note    Adina Jefferson Patient Status:  Inpatient    10/2/2000 MRN [id number]   Location [facility] 6NE-A Attending Mustapha Booker MD   Hosp Day # 2 PCP No primary care provider on file.     Subjective:  24 year old female who presented with epidural and skull fracture. She is POD 2 from evacuation of the epidural and elevation of the depressed skull fracture.       Objective/Physical Exam:  /79   Pulse 75   Temp 98.1 °F (36.7 °C) (Temporal)   Resp 15   Ht 67\"   Wt 120 lb 14.4 oz (54.8 kg)   LMP  (LMP Unknown)   SpO2 97%   BMI 18.94 kg/m²     Intake/Output Summary (Last 24 hours) at 4/10/2025 1009  Last data filed at 4/10/2025 0800  Gross per 24 hour   Intake 1258.6 ml   Output 1220 ml   Net 38.6 ml       General: Alert, orientated x3.  Cooperative.  No apparent distress.  Head: Incision on the left scalp is clean dry  and intact with drain in place with scant serosang output  Abdomen:  Soft, non-distended,non tender, with no rebound or guarding.  No peritoneal signs. Pelvis stable and non tender  Spine : Non tender, no stepoffs or deformities.   Neuro: Intact motor and sensory throughout  MSK: Normal ROM, no signs of trauma      Labs:  Lab Results   Component Value Date    WBC 13.5 04/10/2025    HGB 10.2 04/10/2025    HCT 30.6 04/10/2025    .0 04/10/2025      Lab Results   Component Value Date    INR 1.05 2025     Lab Results   Component Value Date     04/10/2025    K 4.0 04/10/2025     04/10/2025    CO2 28.0 04/10/2025    BUN 8 04/10/2025    CREATSERUM 0.79 04/10/2025    GLU 90 04/10/2025    CA 8.7 04/10/2025          Assessment/Plan:  24 year old female who presented after sustaining a head injury, epidural and skull fracture   POD 2 from evacuation of the epidural and elevation of the depressed skull fracture.   DVT Prophylaxis when ok by neurosurgery   Tertiary exam complete , no new injuries identified  Rest of care per neuro surgery and  neuroICU  General surgery to sign off      Charmaine Nelson MD  4/10/2025  10:09 AM

## 2025-04-10 NOTE — PLAN OF CARE
Assumed pt care at 0730. Pt a/o x4 and resting in bed. Neuros intact. See flowsheet. L head hemovac intact. Dressing with moderate, sanguinous output. Dressing removed by NS NP this am. Staples jacob. VSS. NSR. Pt c/o mild to moderate incisional pain with relief from pain medication. IS and ambulation encouraged. Pt up to chair and ambulating in hallway. Pt and family updated with poc. Plan to keep drain in for one more day.

## 2025-04-10 NOTE — OCCUPATIONAL THERAPY NOTE
OCCUPATIONAL THERAPY TREATMENT NOTE - INPATIENT     Room Number: 6601/6601-A  Session: 1   Number of Visits to Meet Established Goals: 5    Presenting Problem: Acute SDH after collision at softball game    ASSESSMENT   Patient demonstrates excellent progress this session, goals progressing with 4/9 met this session.    Patient continues to function below baseline with lower body dressing, transfers, dynamic standing balance, functional standing tolerance, and energy conservation strategies.   Contributing factors to remaining limitations include impaired standing balance and medical status.  Next session anticipate patient to progress lower body dressing, dynamic standing balance, functional standing tolerance, and energy conservation strategies.  Occupational Therapy will continue to follow patient for duration of hospitalization.    Patient continues to benefit from continued skilled OT services: for duration of hospitalization, however, given the patient is functioning near baseline level do not anticipate skilled therapy needs at discharge .     History: Patient is a 24 year old female admitted on 4/8/2025 with Presenting Problem: Acute SDH after collision at softball game. Co-Morbidities : none     WEIGHT BEARING RESTRICTION   None      TREATMENT SESSION:  Patient Start of Session: Semi supine in bed with mom in the room     FUNCTIONAL TRANSFER ASSESSMENT  Sit to Stand: Edge of Bed  Edge of Bed: Stand-by Assist  Toilet Transfer: Supervision    BED MOBILITY  Rolling: Supervision  Supine to Sit : Supervision  Scooting: Supervision    BALANCE ASSESSMENT  Static Sitting: Independent  Static Standing: Stand-by Assist    FUNCTIONAL ADL ASSESSMENT  UB Dressing Seated: Not Tested  LB Dressing Seated: Not Tested  Toileting Seated: Supervision    ACTIVITY TOLERANCE: Good. Pt vitals stable. HR up to 120s with stairs with PT. Pt did not c/o pain, sob, dizziness   Pulse: 87  Heart Rate Source: Monitor  Resp: 12  BP:  123/82     BP Method: Automatic  Patient Position: Sitting    O2 SATURATIONS  Oxygen Therapy  SPO2% on Room Air at Rest: 99    EDUCATION PROVIDED  Patient Education : Surgical Precautions; Plan of Care; Proper Body Mechanics  Patient's Response to Education: Verbalized Understanding    Equipment used: None    UPPER EXTREMITY  ROM: within functional limits   Strength: is within functional limits     Coordination  Gross motor: intact    Therapist comments: Pt completed rolling bed mobility and supine to sit with supervision. Pt able to multitask and use working memory while ambulating in the webb with PT. Pt completed toilet transfer and toileting routine with supervision. Pt completed sink-side hand hygiene with supervision. Pt reporting photosensitivity. Pt educated on surgical precautions per MD order.     Patient End of Session: Up in chair, Needs met, Call light within reach, RN aware of session/findings, All patient questions and concerns addressed, Hospital anti-slip socks, Alarm set, Family present    SUBJECTIVE  \"I'm a teary person right now\"     PAIN ASSESSMENT  Rating: Unable to rate  Location: Pt does not report pain        OBJECTIVE  Precautions: Seizure, Drain(s)    AM-PAC ‘6-Clicks’ Inpatient Daily Activity Short Form  -   Putting on and taking off regular lower body clothing?: A Little  -   Bathing (including washing, rinsing, drying)?: A Little  -   Toileting, which includes using toilet, bedpan or urinal? : A Little  -   Putting on and taking off regular upper body clothing?: A Little  -   Taking care of personal grooming such as brushing teeth?: A Little  -   Eating meals?: None    AM-PAC Score:  Score: 19  Approx Degree of Impairment: 42.8%  Standardized Score (AM-PAC Scale): 40.22    PLAN     OT Treatment Plan: Balance activities, Energy conservation/work simplification techniques, ADL training, IADL training, Continued evaluation, Compensatory technique education, Equipment eval/education,  Patient/Family training, Patient/Family education, Endurance training, UE strengthening/ROM, Functional transfer training  Rehab Potential : Good  Frequency: 3-5x/week    OT Goals:   ADL Goals   Patient will perform upper body dressing:  with supervision  Patient will perform lower body dressing:  with supervision  Patient will perform toileting: with supervision     Functional Transfer Goals  Patient will transfer from supine to sit:  with supervision  Patient will transfer from sit to stand:  with supervision  Patient will transfer to toilet:  with supervision     UE Exercise Program Goal  Patient will be supervision with bilateral AROM HEP (home exercise program).     Additional Goals:  Pt will verbalize at least 3 energy conservation techniques  Pt will stand at sink for 5 minutes to complete grooming routine    OT Session Time: 30 minutes  Self-Care Home Management: 10 minutes  Therapeutic Activity: 15 minutes

## 2025-04-10 NOTE — PROGRESS NOTES
[facility]  Neurocritical Care Progress Note     Adina Jefferson Patient Status:  Inpatient    10/2/2000 MRN [id number]   [facility] 6NE-A Attending Mustapha Booker MD   Hosp Day # 2 PCP No primary care provider on file.     Subjective:     No acute events overnight.    Vitals:   Temp:  [98.1 °F (36.7 °C)-99.1 °F (37.3 °C)] 98.1 °F (36.7 °C)  Pulse:  [] 75  Resp:  [12-26] 15  BP: ()/(66-89) 121/79  SpO2:  [95 %-100 %] 97 %  AO: (100)/(61) 100/61  Body mass index is 18.94 kg/m².    Intake/Output:    Intake/Output Summary (Last 24 hours) at 4/10/2025 0915  Last data filed at 4/10/2025 0800  Gross per 24 hour   Intake 1258.6 ml   Output 1220 ml   Net 38.6 ml     Current Meds:  Current Hospital Medications[1]  Physical Examination:   General- No acute distress  CV- RRR  Resp- CTA Bilat  Neuro-  Mental status- awake and alert, regards and follows commands, oriented x3, speech fluent  Cranial nerves- pupils equally round and reactive to light, extraocular muscles intact, visual fields full  Motor- 5/5 throughout  Sens-  Intact to light touch    Diagnostics:   No results found.  Lab Review     Recent Labs   Lab 04/08/25  2117 04/09/25  0417 04/10/25  0432    141 143   K 4.3 4.3 4.0    109 108   CO2 21.0 24.0 28.0   * 127* 90   BUN 13 11 8*   CREATSERUM 0.84 0.76 0.79     Recent Labs   Lab 04/08/25  2117 04/09/25  0417 04/10/25  0431   WBC 22.7* 16.3* 13.5*   HGB 11.2* 10.4* 10.2*   .0 182.0 173.0     Assesment/Plan:   24 year old female with a h/o anxiety and depression p/w traumatic L epidural hematoma . Pt collided with another player during softball game resulting in head trauma, noted to have AMS/n/v thus brought to ED where w/u revealed ct head with mult skull fractures and L epidural hematoma, thus taken to OR for emergent crani for evac     Neuro:  L hemispheric EDH- traumatic 2/2 sports-related injury.   CTA neg for vascular injury.  Now s/p  crani for evac, postop and drain per Neurosurg.   Cont neurochecks/pt/ot/st.  Cont keppra for seizure prophylaxis x7d total.  Skull fractures- per NS and trauma surg  No further neurocritical care needs at this time, further management and outpt follow-up per Neurosurg, will follow peripherally please call with any questions.   Cardiac:  sbp goal 100-150  Pulmonary:  Stable on RA  Renal:  IVFs, monitor BUN/Cr  GI:  PO as tolerated  Heme/ID:  Afebrile, trend leukocytosis  Endocrine/Rheum:  Monitor glucose, sliding scale insulin prn  DVT Prophylaxis:  SCD’s    Goals of the Day: neurochecks   Upon my evaluation, this patient had a high probability of imminent or life-threatening deterioration due to central nervous system failure and multiple injuries, significant trauma, which required my direct attention, intervention, and personal management.     I have personally provided 40 minutes of critical care time, exclusive of time spent on separately billable procedures.  Time includes review of all pertinent laboratory/radiology results, discussion with consultants, and monitoring for potential decompensation.  Performed interventions included  OR for evac of EDH .      Thank you for allowing me to participate in the care of this patient.      Namrata Araujo MD, [facility]  Medical Director of System Neurosciences  Chief, Section of Neurocritical Care  [facility] [place]         [1]   Current Facility-Administered Medications   Medication Dose Route Frequency    HYDROmorphone (Dilaudid) 1 MG/ML injection 0.2 mg  0.2 mg Intravenous Q2H PRN    Or    HYDROmorphone (Dilaudid) 1 MG/ML injection 0.4 mg  0.4 mg Intravenous Q2H PRN    Or    HYDROmorphone (Dilaudid) 1 MG/ML injection 0.8 mg  0.8 mg Intravenous Q2H PRN    sertraline (Zoloft) tab 75 mg  75 mg Oral Daily    levETIRAcetam (Keppra) tab 500 mg  500 mg Oral BID    butalbital-acetaminophen-caffeine (Fioricet) -40 MG per tab 1 tablet  1  tablet Oral Q4H PRN    melatonin tab 3 mg  3 mg Oral Nightly PRN    sodium chloride 0.9% infusion   Intravenous Continuous    oxyCODONE immediate release tab 2.5 mg  2.5 mg Oral Q4H PRN    Or    oxyCODONE immediate release tab 5 mg  5 mg Oral Q4H PRN    pantoprazole (Protonix) 40 mg in sodium chloride 0.9% PF 10 mL IV push  40 mg Intravenous QAM AC    Or    pantoprazole (Protonix) DR tab 40 mg  40 mg Oral QAM AC    acetaminophen (Tylenol) tab 650 mg  650 mg Oral Q4H PRN    Or    acetaminophen (Tylenol) rectal suppository 650 mg  650 mg Rectal Q4H PRN    labetalol (Trandate) 5 mg/mL injection 10 mg  10 mg Intravenous Q10 Min PRN    hydrALAzine (Apresoline) 20 mg/mL injection 10 mg  10 mg Intravenous Q2H PRN    ondansetron (Zofran) 4 MG/2ML injection 4 mg  4 mg Intravenous Q6H PRN    Or    metoclopramide (Reglan) 5 mg/mL injection 10 mg  10 mg Intravenous Q8H PRN    niCARdipine in sodium chloride 0.86% (carDENE) 20 mg/200mL infusion premix  5-15 mg/hr Intravenous Continuous PRN    polyethylene glycol (PEG 3350) (Miralax) 17 g oral packet 17 g  17 g Oral Daily PRN    sennosides (Senokot) tab 17.2 mg  17.2 mg Oral Nightly PRN    bisacodyl (Dulcolax) 10 MG rectal suppository 10 mg  10 mg Rectal Daily PRN    fleet enema (Fleet) rectal enema 133 mL  1 enema Rectal Once PRN

## 2025-04-10 NOTE — PHYSICAL THERAPY NOTE
PHYSICAL THERAPY TREATMENT NOTE - INPATIENT    Room Number: 6601/6601-A     Session: 1     Number of Visits to Meet Established Goals: 3  History related to current admission: Patient is a 24 year old female admitted on 4/8/2025 from Home for Epidural Hematoma. Presented after colliding with another  as she was sliding into the base dx depressed skull fx s/p L crani for evac of epidural hematoma and elevation of depressed skull fx 4/8     HOME SITUATION  Type of Home: Other (Comment) (dorm room)  Home Layout: One level                     Lives With: Roommate    Drives: Yes           Prior Level of [place]: Pt states that she is IND with all ADLs and Gait. Plans on going to OT school once she graduates. Additionally plans to dc home with parents.     Presenting Problem: Epidural Hematoma  Co-Morbidities : none    PHYSICAL THERAPY ASSESSMENT   Patient demonstrates good  progress this session, goals  updated to reflect patient performance.      Patient is requiring contact guard assist as a result of the following impairments: medical status.     Patient continues to function below baseline with gait.  Next session anticipate patient to progress gait.  Physical Therapy will continue to follow patient for duration of hospitalization.    Patient continues to benefit from continued skilled PT services: at discharge to promote prior level of function.  Anticipate patient will return home with OP PT.    PLAN DURING HOSPITALIZATION  Nursing Mobility Recommendation : 1 Assist  PT Device Recommendation: Rolling walker  PT Treatment Plan: Bed mobility, Body mechanics, Gait training, Strengthening, Stair training, Transfer training, Balance training  Frequency (Obs): 3-5x/week     CURRENT GOALS     Goal #1 Patient is able to demonstrate supine - sit EOB @ level: supervision   MET 4/10   Goal #2 Patient is able to demonstrate transfers EOB to/from BSC at assistance level: supervision   MET 4/10   Goal #3  Patient is able to ambulate 150 feet with assist device: walker - rolling at assistance level: supervision  Updated 4/10 - ambulate 150ft w/o device and SBA      Goal #4  Patient will participate in sanchez balance asessment    Goal #5     Goal #6     Goal Comments: Goals established on 4/9/2025  4/10/2025 all goals ongoing    SUBJECTIVE  \"I usually only wear one contact\"     OBJECTIVE  Precautions: Seizure, Drain(s) (-150)    WEIGHT BEARING RESTRICTION     PAIN ASSESSMENT   Rating: Unable to rate  Location: pt reports pain controlled  Management Techniques: Activity promotion, Body mechanics, Repositioning    BALANCE                                                                                                                       Static Sitting: Good  Dynamic Sitting: Good           Static Standing: Good  Dynamic Standing: Good    ACTIVITY TOLERANCE                         O2 WALK       AM-PAC '6-Clicks' INPATIENT SHORT FORM - BASIC MOBILITY  How much difficulty does the patient currently have...  Patient Difficulty: Turning over in bed (including adjusting bedclothes, sheets and blankets)?: None   Patient Difficulty: Sitting down on and standing up from a chair with arms (e.g., wheelchair, bedside commode, etc.): None   Patient Difficulty: Moving from lying on back to sitting on the side of the bed?: None   How much help from another person does the patient currently need...   Help from Another: Moving to and from a bed to a chair (including a wheelchair)?: None   Help from Another: Need to walk in hospital room?: A Little   Help from Another: Climbing 3-5 steps with a railing?: A Little     AM-PAC Score:  Raw Score: 22   Approx Degree of Impairment: 20.91%   Standardized Score (AM-PAC Scale): 53.28   CMS Modifier (G-Code): CJ    FUNCTIONAL ABILITY STATUS  Gait Assessment   Functional Mobility/Gait Assessment  Gait Assistance: Contact guard assist  Distance (ft): 200  Assistive Device: None  Pattern: Within  Functional Limits (slower alicia)  Stairs: Stairs  How Many Stairs: 12  Device: 1 Rail  Assist: Contact guard assist  Pattern: Ascend and Descend  Ascend and Descend : Step to    Skilled Therapy Provided    Bed Mobility:  Rolling: SBA   Supine<>Sit: SBA   Sit<>Supine: NT     Transfer Mobility:  Sit<>Stand: CGA   Stand<>Sit: CGA   Gait: CGA      4 Item Dynamic Gait Index Score: 9/12 Fall Risk: yes  [Scores of 10 or less indicate increased risk for falls]  Gait level surface: 3  Grading: Claude the lowest category that applies.  (3)  Normal: Walks 20’, no assistive devices, good speed, no evidence of imbalance, normal gait pattern.  (2)  Mild Impairment: Walks 20’, uses assistive devices, slower speed, mild gait deviations.  (1)  Moderate Impairment: Walks 20’, slow speed, abnormal gait pattern, evidence of imbalance.  (0)  Severe Impairment: Cannot walk 20’ without assistance, severe gait deviations or imbalance.     Change in gait speed: 2  Grading: Claude the lowest category that applies.  (3)  Normal: Able to smoothly change walking speed without loss of balance or gait deviation. Shows a significant difference in walking speed between normal, fast and slow speeds.   (2)  Mild Impairment: Is able to change speed but demonstrates mild gait deviations, or no gait deviations but unable to achieve a significant change in velocity, or uses an assistive device.   (1)  Moderate Impairment: Makes only minor adjustments to walking speed, or accomplishes a change in speed with significant gait deviations, or changes speed but has significant gait deviations, or changes speed but loses balance but is able to recover and continue walking.  (0)  Severe Impairment: Cannot change speeds, or loses balance and has to reach for wall or be caught.    Gait with horizontal head turns: 2  (3)  Normal: Performs head turns smoothly with no change in gait  (2)  Mild Impairment: Performs head turns smoothly with slight change in gait  velocity, i.e., minor disruption to smooth gait path or uses walking aid.  (1) Moderate Impairment: Performs head turns with moderate change in gait velocity, slows down, staggers but recovers, can continue to walk.  (0)  Severe Impairment: Performs task with severe disruption of gait, i.e., staggers outside 15” path, loses balance, stops, reaches for wall.    Gait with vertical head turns: 2  Grading: Claude the lowest category that applies.  (3) Normal: Performs head turns smoothly with no change in gait.  (2) Mild Impairment: Performs head turns smoothly with slight change in gait velocity, i.e., minor disruption to smooth gait path or uses walking aid.  (1) Moderate Impairment: Performs head turns with moderate change in gait velocity, slows down, staggers but recovers, can continue to walk.  (0) Severe Impairment: Performs task with severe disruption of gait, i.e., staggers outside 15” path, loses balance, stops, reaches for wall.      Therapist's Comments: Discussed case with RN prior to session initiation. Pt agreeable to participation in therapy. Gait belt donned for out of bed mobility. Able to participate in further gait training this session - noted slow alicia - addressed with cuing for working on increasing speed for 25ft. Additionally progressed to addition of horizontal and vertical head turns while ambulating.           Patient End of Session: Up in chair, Needs met, Call light within reach, RN aware of session/findings, All patient questions and concerns addressed, Hospital anti-slip socks, Family present    PT Session Time: 23 minutes  Gait Trainin minutes  Therapeutic Activity:  minutes  Therapeutic Exercise:  minutes   Neuromuscular Re-education:  minutes

## 2025-04-10 NOTE — PAYOR COMM NOTE
--------------  4/10:  CONTINUED STAY REVIEW    Payor: [facility]  Subscriber #:  [id number]  Authorization Number: [id number]    Admit date: 25  Admit time:  9:01 PM      Neurosurgery Progress Note           Adina Jefferson Patient Status:  Inpatient    10/2/2000 MRN [id number]   [facility] 6NE-A Attending Mustapha Booker MD   Hosp Day # 2 PCP No primary care provider on file.      Chief Complaint:  Epidural hematoma  Skull Fracture  AMS     POD #2 s/p left craniotomy for evacuation of epidural hematoma, elevation of depressed skull fracture     Subjective:  Increased oozing from surgical site. Pain controlled. Able to get up and walk to the door with therapy.  She reports getting a little nauseous.  Increased swelling on the left side.     Objective/Physical Exam:     Vital Signs:  Blood pressure 121/79, pulse 75, temperature 98.1 °F (36.7 °C), temperature source Temporal, resp. rate 15, height 67\", weight 120 lb 14.4 oz (54.8 kg), SpO2 97%.  Respiratory:  nonlabored  CV:  well perfused  General: NAD, Speech Fluent, Mood Appropriate  Neurologic:   A&Ox3  PERRL, EOMi, FS, TM  Full strength x 4, no drift  Skin: Dressing removed.  Staples intact, no active oozing noted.     Output by Drain (mL) 25 07 - 25 18525 190 - 25 0659 25 07 - 25 1859 25 190 - 04/10/25 0659 04/10/25 07 - 04/10/25 0952   Closed Drain Left Scalp Accordion 10 Fr.   110 70 60              Labs:         Recent Labs   Lab 25  1621 25  2117 25  0417 04/10/25  0431   RBC 3.94 3.48* 3.28* 3.22*   HGB 12.6 11.2* 10.4* 10.2*   HCT 37.0 32.2* 31.1* 30.6*   MCV 93.9 92.5 94.8 95.0   MCH 32.0 32.2 31.7 31.7   MCHC 34.1 34.8 33.4 33.3   RDW 11.0 11.2 11.0 11.3   NEPRELIM 7.30 20.88* 15.17*  --    WBC 12.6* 22.7* 16.3* 13.5*   .0 209.0 182.0 173.0               Recent Labs   Lab 25  2117 25  0417 04/10/25  0432   * 127* 90   BUN 13 11 8*   CREATSERUM  0.84 0.76 0.79   EGFRCR 101 114 108   CA 8.2* 7.7* 8.7    141 143   K 4.3 4.3 4.0    109 108   CO2 21.0 24.0 28.0         Imaging:  CTA BRAIN + CTA CAROTIDS (CPT=70496/84477)  Result Date: 4/8/2025  CONCLUSION:   1. No evidence of acute arterial injury. No evidence of intracranial aneurysm, flow-limiting stenosis, or focal arterial occlusion.  2. No evidence of occlusion, dissection, or flow-limiting stenosis in the cervical vertebral or carotid arteries. No evidence of hemodynamically significant carotid stenosis by NASCET criteria.  3. Interval left pterional craniotomy changes noted.  There has been evacuation of the previously visualized epidural hematoma along the left cerebral convexity and extending into the left middle cranial fossa.  There is scattered minimal residual blood products and postprocedural pneumocephalus seen.  There is decreased mass effect.  There is decreased depression of the previously visualized left temporal bone fracture fragments.  Surgical clips are seen in the left scalp along with soft tissue gas and  swelling along the left scalp.  4. Fracture planes are again noted extending into the bilateral greater sphenoid wings, floor of the left middle cranial fossa, sphenoid sinuses, sella, bilateral carotid canals, bilateral temporomandibular joints.   Please see above for further details.  LOCATION:  [facility]   Dictated by (CST): Hermelindo Taylor MD on 4/08/2025 at 10:51 PM     Finalized by (CST): Hermelindo Taylor MD on 4/08/2025 at 10:56 PM        CT FACIAL BONES (CPT=70486)  Result Date: 4/8/2025  CONCLUSION:   1. Redemonstration of comminuted fractures of the left temporal bone as well as the bilateral greater sphenoid wings.  Fracture planes extend into the bilateral sphenoid sinuses where there are air-fluid levels.  Fracture planes extend into the bilateral  carotid canals as well as the floor of the left middle cranial fossa.  Fracture planes extend into the bilateral  temporomandibular joints.  Fracture planes extend into the base of the sella turcica.  2. Partially imaged postoperative changes from recent left pterional craniotomy.  Postoperative gas in the left temporal scalp soft tissues along with soft tissue swelling.    LOCATION:  [facility]   Dictated by (CST): Hermelindo Taylor MD on 4/08/2025 at 10:34 PM     Finalized by (CST): Hermelindo Taylor MD on 4/08/2025 at 10:38 PM        US INTRAOPERATIVE GUIDANCE (CPT=76998)  Result Date: 4/8/2025  CONCLUSION:  Sonographic image(s) obtained and submitted during craniotomy procedure.  No radiologist was present for the exam.  Correlation with real-time sonography and operative report are recommended.   LOCATION:  [id number]    Dictated by (CST): Nate Harding MD on 4/08/2025 at 8:07 PM     Finalized by (CST): Nate Harding MD on 4/08/2025 at 8:08 PM        CT BRAIN OR HEAD (CPT=70450)  Result Date: 4/8/2025  CONCLUSION:   1. Comminuted left temporal bone fracture noted with depression of multiple fragments.  Fracture planes also extend into the bilateral greater sphenoid wings, floor of the left middle cranial fossa, sphenoid sinuses, sella, bilateral carotid canals, bilateral temporomandibular joints. Soft tissue laceration in the adjacent left temporal scalp along with soft tissue swelling.  2. There is an extra-axial hemorrhage along the lateral left cerebral convexity, most pronounced along the left temporal lobe as well as the floor of the left middle cranial fossa measuring up to 10 mm in thickness.  This is most likely in the epidural space.  There is mild local mass effect.  Critical results were discussed with Dr. Bedoya at 1648 hours on 4/8/2025. Critical results were read back.   LOCATION:  [facility]   Dictated by (CST): Hermelindo Taylor MD on 4/08/2025 at 4:42 PM     Finalized by (CST): Hermelindo Taylor MD on 4/08/2025 at 4:51 PM        CT SPINE CERVICAL (CPT=72125)  Result Date: 4/8/2025  CONCLUSION:  No evidence of acute  fracture of the cervical spine.  No significant bony central canal or neural foraminal stenosis is seen.  If there is persistent clinical concern then recommend follow-up imaging with MRI.    LOCATION:  [id number]   Dictated by (CST): Quoc Zimmerman MD on 4/08/2025 at 4:42 PM     Finalized by (CST): Quoc Zimmerman MD on 4/08/2025 at 4:45 PM        Assessment:  23 yo female who sustained a left epidural hematoma after an injury playing softball s/p craniotomy for hematoma evacuation.     Plan:  Medical management per NCC/ Hospitalist  Keppra x7 days  Pain meds PRN  Continue drain, please call if there is increased oozing at the surgical site  Therapy as tolerated  DVT prophylaxis SCD, Can start chemoprophlaxis 48 hours post surgery        Is this a shared or split note between Advanced Practice Provider and Physician? Yes         TOD Arciniega  [facility]  4/10/2025  Spectre [id number]                        MEDICATIONS ADMINISTERED IN LAST 1 DAY:  acetaminophen (Tylenol) tab 650 mg       Date Action Dose Route User    4/10/2025 0509 Given 650 mg Oral Doni Gooden RN          butalbital-acetaminophen-caffeine (Fioricet) -40 MG per tab 1 tablet       Date Action Dose Route User    4/10/2025 1054 Given 1 tablet Oral Julienne Cardenas RN    4/10/2025 0545 Given 1 tablet Oral Doni Gooden RN    4/9/2025 2343 Given 1 tablet Oral Doni Gooden RN          HYDROmorphone (Dilaudid) 1 MG/ML injection 0.4 mg       Date Action Dose Route User    4/10/2025 1138 Given 0.4 mg Intravenous Julienne Cardenas RN    4/10/2025 0919 Given 0.4 mg Intravenous Julienne Cardenas RN    4/10/2025 0219 Given 0.4 mg Intravenous Doni Gooden RN    4/9/2025 2117 Given 0.4 mg Intravenous Doni Gooden RN    4/9/2025 1743 Given 0.4 mg Intravenous Rosie Montelongo RN    4/9/2025 1528 Given 0.4 mg Intravenous [place], Rosie J, RN          levETIRAcetam (Keppra) tab 500 mg       Date  Action Dose Route User    4/10/2025 0820 Given 500 mg Oral Julienne Cardenas RN    4/9/2025 2003 Given 500 mg Oral Doni Gooden RN          oxyCODONE immediate release tab 2.5 mg       Date Action Dose Route User    4/10/2025 0745 Given 2.5 mg Oral Julienne Cardenas RN    4/10/2025 0407 Given 2.5 mg Oral Doni Gooden RN          oxyCODONE immediate release tab 5 mg       Date Action Dose Route User    4/10/2025 1230 Given 5 mg Oral Julienne Cardenas RN    4/10/2025 0111 Given 5 mg Oral Doni Gooden RN    4/9/2025 2002 Given 5 mg Oral Doni Gooden RN    4/9/2025 1617 Given 5 mg Oral [place] Rosie Santoyo RN          pantoprazole (Protonix) DR tab 40 mg       Date Action Dose Route User    4/10/2025 0633 Given 40 mg Oral Doni Gooden RN          sertraline (Zoloft) tab 75 mg       Date Action Dose Route User    4/10/2025 0820 Given 75 mg Oral Julienne Cardenas RN            Vitals (last day)       Date/Time Temp Pulse Resp BP SpO2 Weight O2 Device O2 Flow Rate (L/min) [facility]    04/10/25 1200 98.1 °F (36.7 °C) 86 18 121/87 99 % -- None (Room air) -- AM    04/10/25 1145 -- 87 12 123/82 -- -- -- -- CH    04/10/25 1100 -- 76 11 121/85 94 % -- -- -- AM    04/10/25 1000 -- 80 13 122/83 98 % -- -- -- AM    04/10/25 0900 -- 75 15 121/79 97 % -- -- -- AM    04/10/25 0800 98.1 °F (36.7 °C) 81 12 108/72 100 % -- None (Room air) -- AM    04/10/25 0700 -- 71 17 112/75 97 % -- -- -- FS    04/10/25 0600 -- 73 13 121/85 98 % -- -- -- FS    04/10/25 0500 -- 72 15 105/77 97 % 120 lb 14.4 oz (54.8 kg) -- -- FS    04/10/25 0400 98.1 °F (36.7 °C) 82 16 113/80 97 % -- None (Room air) -- FS    04/10/25 0300 -- 76 14 106/69 97 % -- -- -- FS    04/10/25 0200 -- 78 15 107/69 95 % -- -- -- FS    04/10/25 0100 -- 78 15 112/78 96 % -- -- -- FS    04/10/25 0000 98.6 °F (37 °C) 68 13 108/77 97 % -- None (Room air) -- FS 04/09/25 2300 -- 79 14 105/72 97 % -- -- -- FS 04/09/25 2200 -- 71 14 106/67 96 % -- --  -- FS    04/09/25 2100 -- 96 14 112/82 97 % -- -- -- FS    04/09/25 2000 99.1 °F (37.3 °C) 76 15 116/76 97 % -- None (Room air) -- FS    04/09/25 1800 -- 83 14 117/77 96 % -- -- -- CV    04/09/25 1700 -- 83 17 110/72 96 % -- -- -- CV    04/09/25 1600 99.1 °F (37.3 °C) 74 16 105/74 96 % -- None (Room air) -- CV    04/09/25 1500 -- 73 15 109/74 97 % -- -- -- CV    04/09/25 1400 -- 107 26 109/89 96 % -- -- -- CV    04/09/25 1300 -- 69 15 97/69 95 % -- -- -- CV    04/09/25 1200 99 °F (37.2 °C) 69 14 103/71 95 % -- None (Room air) -- CV    04/09/25 1100 -- 70 13 104/66 95 % -- -- -- CV    04/09/25 1000 -- 68 15 104/67 95 % -- -- -- CV    04/09/25 0900 -- 68 16 102/67 95 % -- -- -- CV    04/09/25 0800 -- 101 14 110/72 97 % -- None (Room air) -- CV    04/09/25 0700 -- 97 14 111/75 97 % -- -- -- FS    04/09/25 0600 -- 94 15 118/69 97 % -- -- -- FS    04/09/25 0500 -- 82 13 117/75 96 % -- -- -- FS    04/09/25 0400 98.8 °F (37.1 °C) 81 15 120/82 98 % -- None (Room air) -- FS    04/09/25 0300 -- 72 15 112/75 96 % -- -- -- FS    04/09/25 0200 -- 78 14 115/66 97 % -- -- -- FS    04/09/25 0100 -- 75 15 112/77 97 % -- -- -- FS    04/09/25 0000 97.9 °F (36.6 °C) 80 15 106/72 97 % -- None (Room air) -- FS

## 2025-04-10 NOTE — SLP NOTE
July 16, 2019      Tanvi Newby MD  [address]  [place] [address]           [facility] - Endo Surgery 2nd [place]  [address]  [facility] [address]  Phone: [phone]          Patient: Zeynep Wang   MR Number: [id number]   YOB: 1968   Date of Visit: 7/11/2019       Dear Dr. Tanvi Newby:    Thank you for referring Zeynep Wang to me for evaluation. Attached you will find relevant portions of my assessment and plan of care.    If you have questions, please do not hesitate to call me. I look forward to following Zeynep Wang along with you.    Sincerely,    Kaycee Allred MD    Enclosure  CC:  No Recipients    If you would like to receive this communication electronically, please contact [email or web address] or [phone] to request more information on yuback Link access.    For providers and/or their staff who would like to refer a patient to [facility], please contact us through our one-stop-shop provider referral line, [facility], at [phone].    If you feel you have received this communication in error or would no longer like to receive these types of communications, please e-mail [email or web address]          SPEECH/LANGUAGE/COGNITIVE EVALUATION - INPATIENT    Admission Date: 4/8/2025  Evaluation Date: 04/10/25         ASSESSMENT & PLAN   ASSESSMENT & IMPRESSION  Pt seen for a communication evaluation. Pt was admitted on 04/08/2025 for a head injury while playing softball. Pt had left craniotomy for evacuation of epidural hematoma on 04/08/2025. Pt was profoundly dysphasic after head injury and before surgery. Today, Pt was in bed upon entry to the room. Pts father was present during evaluation. Pt reported intermittent word finding difficulties when she is more fatigued. Pt reported she is \"not too foggy.\" Pts auditory processing has improved since writers last visit.     Pt participated in WAB Bedside. Pt scored 100/100. Pt does not present with any expressive/receptive deficits. Pts spontaneous speech: content/fluency, auditory verbal comprehension, following sequential commands, repetition, object naming, reading, and writing are all WFL. Pt reported that she did \"good\" on the exam.     SLP discussed results of the exam with Pt and Pts father. SLP discussed cognitive evaluation to gain insight for any cognitive deficits. Pt and Pts father were agreeable to evaluation, and had no questions. SLP will continue to follow for completion of cognitive evaluation.     Assessment(s) Administered: WAB Bedside Score     WAB Bedside Score: 100/100             Deficits Identified:  (.)    Patient Experiencing Pain: No                           GOALS  Goal #1 Pt will participate in a communication evaluation. Met 04/10/2025      Goal #2 Pt will participate in a cognitive evaluation.     In Progress   Prior Living Situation: Home with support  Prior Level of Function: Independent      Imaging Results:   CTA BRAIN  CONCLUSION:       1. No evidence of acute arterial injury. No evidence of intracranial aneurysm, flow-limiting stenosis, or focal arterial occlusion.      2. No evidence of occlusion, dissection, or flow-limiting stenosis  in the cervical vertebral or carotid arteries. No evidence of hemodynamically significant carotid stenosis by NASCET criteria.      3. Interval left pterional craniotomy changes noted.  There has been evacuation of the previously visualized epidural hematoma along the left cerebral convexity and extending into the left middle cranial fossa.  There is scattered minimal residual blood   products and postprocedural pneumocephalus seen.  There is decreased mass effect.  There is decreased depression of the previously visualized left temporal bone fracture fragments.  Surgical clips are seen in the left scalp along with soft tissue gas and    swelling along the left scalp.      4. Fracture planes are again noted extending into the bilateral greater sphenoid wings, floor of the left middle cranial fossa, sphenoid sinuses, sella, bilateral carotid canals, bilateral temporomandibular joints.       Please see above for further details.      LOCATION:  [facility]         Dictated by (CST): Hermelindo Taylor MD on 4/08/2025 at 10:51 PM       Finalized by (CST): Hermelindo Taylor MD on 4/08/2025 at 10:56 PM       Patient/Family Goals: none stated      Patient, family and/or caregiver has been informed and has taken part in this evaluation and plan of treatment and have been advised and agree on the findings and goals.      FOLLOW UP  Treatment Plan/Recommendations: Communication evaluation  Duration: 1 week  Follow Up Needed (Documentation Required): Yes  SLP Follow-up Date: 04/11/25    Thank you for your referral.  If you have any questions please contact Amber García  Clinician   [facility] [phone]

## 2025-04-10 NOTE — PROGRESS NOTES
[facility]  Neurosurgery Progress Note    Adina Jefferson Patient Status:  Inpatient    10/2/2000 MRN [id number]   Location [facility] 6NE-A Attending uMstapha Booker MD   Hosp Day # 2 PCP No primary care provider on file.     Chief Complaint:  Epidural hematoma  Skull Fracture  AMS    POD #2 s/p left craniotomy for evacuation of epidural hematoma, elevation of depressed skull fracture    Subjective:  Increased oozing from surgical site. Pain controlled. Able to get up and walk to the door with therapy.  She reports getting a little nauseous.  Increased swelling on the left side.    Objective/Physical Exam:    Vital Signs:  Blood pressure 121/79, pulse 75, temperature 98.1 °F (36.7 °C), temperature source Temporal, resp. rate 15, height 67\", weight 120 lb 14.4 oz (54.8 kg), SpO2 97%.  Respiratory:  nonlabored  CV:  well perfused  General: NAD, Speech Fluent, Mood Appropriate  Neurologic:   A&Ox3  PERRL, EOMi, FS, TM  Full strength x 4, no drift  Skin: Dressing removed.  Staples intact, no active oozing noted.    Output by Drain (mL) 25 0700 - 25 1859 25 1900 - 25 0659 25 0700 - 25 1859 25 1900 - 04/10/25 0659 04/10/25 0700 - 04/10/25 0952   Closed Drain Left Scalp Accordion 10 Fr.  110 70 60          Labs:  Recent Labs   Lab 25  1621 25  2117 25  0417 04/10/25  0431   RBC 3.94 3.48* 3.28* 3.22*   HGB 12.6 11.2* 10.4* 10.2*   HCT 37.0 32.2* 31.1* 30.6*   MCV 93.9 92.5 94.8 95.0   MCH 32.0 32.2 31.7 31.7   MCHC 34.1 34.8 33.4 33.3   RDW 11.0 11.2 11.0 11.3   NEPRELIM 7.30 20.88* 15.17*  --    WBC 12.6* 22.7* 16.3* 13.5*   .0 209.0 182.0 173.0       Recent Labs   Lab 25  2117 25  0417 04/10/25  0432   * 127* 90   BUN 13 11 8*   CREATSERUM 0.84 0.76 0.79   EGFRCR 101 114 108   CA 8.2* 7.7* 8.7    141 143   K 4.3 4.3 4.0    109 108   CO2 21.0 24.0 28.0       Imaging:  CTA BRAIN + CTA CAROTIDS  (CPT=70496/11772)  Result Date: 4/8/2025  CONCLUSION:   1. No evidence of acute arterial injury. No evidence of intracranial aneurysm, flow-limiting stenosis, or focal arterial occlusion.  2. No evidence of occlusion, dissection, or flow-limiting stenosis in the cervical vertebral or carotid arteries. No evidence of hemodynamically significant carotid stenosis by NASCET criteria.  3. Interval left pterional craniotomy changes noted.  There has been evacuation of the previously visualized epidural hematoma along the left cerebral convexity and extending into the left middle cranial fossa.  There is scattered minimal residual blood products and postprocedural pneumocephalus seen.  There is decreased mass effect.  There is decreased depression of the previously visualized left temporal bone fracture fragments.  Surgical clips are seen in the left scalp along with soft tissue gas and  swelling along the left scalp.  4. Fracture planes are again noted extending into the bilateral greater sphenoid wings, floor of the left middle cranial fossa, sphenoid sinuses, sella, bilateral carotid canals, bilateral temporomandibular joints.   Please see above for further details.  LOCATION:  [facility]   Dictated by (CST): Hermelindo Taylor MD on 4/08/2025 at 10:51 PM     Finalized by (CST): Hermelindo Taylor MD on 4/08/2025 at 10:56 PM       CT FACIAL BONES (CPT=70486)  Result Date: 4/8/2025  CONCLUSION:   1. Redemonstration of comminuted fractures of the left temporal bone as well as the bilateral greater sphenoid wings.  Fracture planes extend into the bilateral sphenoid sinuses where there are air-fluid levels.  Fracture planes extend into the bilateral  carotid canals as well as the floor of the left middle cranial fossa.  Fracture planes extend into the bilateral temporomandibular joints.  Fracture planes extend into the base of the sella turcica.  2. Partially imaged postoperative changes from recent left pterional craniotomy.   Postoperative gas in the left temporal scalp soft tissues along with soft tissue swelling.    LOCATION:  [facility]   Dictated by (CST): Hermelindo Taylor MD on 4/08/2025 at 10:34 PM     Finalized by (CST): Hermelindo Taylor MD on 4/08/2025 at 10:38 PM       US INTRAOPERATIVE GUIDANCE (CPT=76998)  Result Date: 4/8/2025  CONCLUSION:  Sonographic image(s) obtained and submitted during craniotomy procedure.  No radiologist was present for the exam.  Correlation with real-time sonography and operative report are recommended.   LOCATION:  [id number]    Dictated by (CST): Nate Harding MD on 4/08/2025 at 8:07 PM     Finalized by (CST): Nate Harding MD on 4/08/2025 at 8:08 PM       CT BRAIN OR HEAD (CPT=70450)  Result Date: 4/8/2025  CONCLUSION:   1. Comminuted left temporal bone fracture noted with depression of multiple fragments.  Fracture planes also extend into the bilateral greater sphenoid wings, floor of the left middle cranial fossa, sphenoid sinuses, sella, bilateral carotid canals, bilateral temporomandibular joints. Soft tissue laceration in the adjacent left temporal scalp along with soft tissue swelling.  2. There is an extra-axial hemorrhage along the lateral left cerebral convexity, most pronounced along the left temporal lobe as well as the floor of the left middle cranial fossa measuring up to 10 mm in thickness.  This is most likely in the epidural space.  There is mild local mass effect.  Critical results were discussed with Dr. Bedoya at 1648 hours on 4/8/2025. Critical results were read back.   LOCATION:  [facility]   Dictated by (CST): Hermelindo Taylor MD on 4/08/2025 at 4:42 PM     Finalized by (CST): Hermelindo Taylor MD on 4/08/2025 at 4:51 PM       CT SPINE CERVICAL (CPT=72125)  Result Date: 4/8/2025  CONCLUSION:  No evidence of acute fracture of the cervical spine.  No significant bony central canal or neural foraminal stenosis is seen.  If there is persistent clinical concern then recommend follow-up imaging  with MRI.    LOCATION:  [id number]   Dictated by (CST): Quoc Zimmerman MD on 4/08/2025 at 4:42 PM     Finalized by (CST): Quoc Zimmerman MD on 4/08/2025 at 4:45 PM        Assessment:  25 yo female who sustained a left epidural hematoma after an injury playing softball s/p craniotomy for hematoma evacuation.    Plan:  Medical management per NCC/ Hospitalist  Keppra x7 days  Pain meds PRN  Continue drain, please call if there is increased oozing at the surgical site  Therapy as tolerated  DVT prophylaxis SCD, Can start chemoprophlaxis 48 hours post surgery      Is this a shared or split note between Advanced Practice Provider and Physician? Yes       TOD Arciniega  [facility]  4/10/2025  [facility] [phone]

## 2025-04-10 NOTE — PROGRESS NOTES
[facility]   part of [facility]     Hospitalist Progress Note     Adina Jefferson Patient Status:  Inpatient    10/2/2000 MRN [id number]   Location [facility] 6NE-A Attending Mustapha Booker MD   Hosp Day # 2 PCP No primary care provider on file.     Chief Complaint: Head trauma     Subjective:     Patient still has pain, but controlled with current meds.  Denies fever, chills, n/v.  No cp.  No new complaints.      Objective:    Review of Systems:   A comprehensive review of systems was completed; pertinent positive and negatives stated in subjective.    Vital signs:  Temp:  [98.1 °F (36.7 °C)-99.1 °F (37.3 °C)] 98.1 °F (36.7 °C)  Pulse:  [] 86  Resp:  [11-26] 18  BP: ()/(67-89) 121/87  SpO2:  [94 %-100 %] 99 %    Physical Exam:    General: No acute distress  HEENT:  Surgical incision noted, c/d/i  Respiratory: No wheezes, no rhonchi  Cardiovascular: S1, S2, regular rate and rhythm  Abdomen: Soft, Non-tender, non-distended, positive bowel sounds  Neuro: No new focal deficits.   Extremities: No edema    Diagnostic Data:    Labs:  Recent Labs   Lab 04/08/25  1621 04/08/25  2117 04/09/25  0417 04/10/25  0431   WBC 12.6* 22.7* 16.3* 13.5*   HGB 12.6 11.2* 10.4* 10.2*   MCV 93.9 92.5 94.8 95.0   .0 209.0 182.0 173.0   INR 1.05  --   --   --        Recent Labs   Lab 25  1621 04/08/25  2117 04/09/25  0417 04/10/25  0432   * 150* 127* 90   BUN 15 13 11 8*   CREATSERUM 1.04* 0.84 0.76 0.79   CA 9.5 8.2* 7.7* 8.7   ALB 4.6  --   --   --     139 141 143   K 3.3* 4.3 4.3 4.0    109 109 108   CO2 25.0 21.0 24.0 28.0   ALKPHO 59  --   --   --    AST 24  --   --   --    ALT 10  --   --   --    BILT 0.3  --   --   --    TP 7.1  --   --   --        Estimated Glomerular Filtration Rate: 108 mL/min/1.73m2 (result from lab).    No results for input(s): \"TROP\", \"TROPHS\", \"CK\" in the last 168 hours.    Recent Labs   Lab 25  1621   PTP 13.8   INR 1.05                   Microbiology    No results found for this visit on 04/08/25.      Imaging: Reviewed in Epic.    Medications: Scheduled Medications[1]    Assessment & Plan:      #Left hemisphere Epidural Hematoma  S/p craniotomy with evacuation   Neurosurgery and NeuroCC followinng  PT/OT/ST and frequent neurochecks  Keppra for Seizure precautions - 7 days total  Pain control  Drains in place    #Multiple skull fractures  Reviewed CT imaging   S/p sports injury     #Anemia  Hg 11.2 -> 10.4 -> 10.2 post procedure  No e/o of acute blood loss  Will trend CBC    #Leukocytosis  Likely reactive  Afebrile, monitor off abx at this time  Appropriately downtrending            Mustapha Booker MD    Supplementary Documentation:     Quality:  DVT Mechanical Prophylaxis:   SCDs,    DVT Pharmacologic Prophylaxis   Medication   None                Code Status: Full Code  Agarwal: No urinary catheter in place  Agarwal Duration (in days): 2  Central line:    BRITNEY:     Discharge is dependent on: surgery recovery   At this point Ms. Jefferson is expected to be discharge to: Home    The 21st Century Cures Act makes medical notes like these available to patients in the interest of transparency. Please be advised this is a medical document. Medical documents are intended to carry relevant information, facts as evident, and the clinical opinion of the practitioner. The medical note is intended as peer to peer communication and may appear blunt or direct. It is written in medical language and may contain abbreviations or verbiage that are unfamiliar.                         [1]    sertraline  75 mg Oral Daily    levETIRAcetam  500 mg Oral BID    pantoprazole  40 mg Intravenous QAM AC    Or    pantoprazole  40 mg Oral QAM AC

## 2025-04-11 PROCEDURE — 99232 SBSQ HOSP IP/OBS MODERATE 35: CPT | Performed by: HOSPITALIST

## 2025-04-11 NOTE — OCCUPATIONAL THERAPY NOTE
OCCUPATIONAL THERAPY TREATMENT NOTE - INPATIENT     Room Number: 6601/6601-A  Session: 2   Number of Visits to Meet Established Goals: 5    Presenting Problem: Acute SDH after collision at softball game    ASSESSMENT   Pt is functioning at supervision level for all ADLs, transfers, and mobility. Occupational Therapy will discharge patient at this time. Recommending supervision for bathing.       History:  Patient is a 24 year old female admitted on 4/8/2025 with Presenting Problem: Acute SDH after collision at softball game. Co-Morbidities : none   ** 4/8 s/p L craniotomy for evacuation of epidural hematoma, evaluation of depressed skull fracture    WEIGHT BEARING RESTRICTION   none    TREATMENT SESSION:  Patient Start of Session: Semi supine in bed with family in the room     FUNCTIONAL TRANSFER ASSESSMENT  Sit to Stand: Edge of Bed  Edge of Bed: Supervision  Toilet Transfer: Independent    BED MOBILITY  Rolling: Supervision  Supine to Sit : Supervision  Scooting: Supervision    BALANCE ASSESSMENT  Static Sitting: Independent  Static Standing: Supervision    FUNCTIONAL ADL ASSESSMENT  UB Dressing Seated: Not Tested  LB Dressing Seated: Not Tested  Toileting Seated: Independent    ACTIVITY TOLERANCE: Good. Pt vitals stable, pt not c/o of dizziness or sob   Pulse: 94  Heart Rate Source: Monitor     BP: 111/82    O2 SATURATIONS  Oxygen Therapy  SPO2% on Room Air at Rest: 97    EDUCATION PROVIDED  Patient Education : Discharge Recommendations; Fall Prevention; Surgical Precautions; Energy Conservation; Proper Body Mechanics  Patient's Response to Education: Verbalized Understanding    Equipment used: None      UPPER EXTREMITY  ROM: within functional limits   Strength: is within functional limits     Coordination  Gross motor: intact  Fine motor: finger to nose accurate and symmetrical     Therapist comments: Pt with intact working and short term memory while completing balance training with PT, see PT note. Pt educated  on surgical precautions, proper body mechanics, and activity modification of ADLs. Pt education on pacing and energy conservation for return to home.     Patient End of Session: Up in chair, Call light within reach, RN aware of session/findings, Needs met, All patient questions and concerns addressed, Hospital anti-slip socks, Family present    SUBJECTIVE  \"I'm not dizzy... things are just blurry without my contacts\"     PAIN ASSESSMENT  Rating: Unable to rate  Location: Pt does not report pain        OBJECTIVE  Precautions: Seizure, Drain(s) (-150)    AM-PAC ‘6-Clicks’ Inpatient Daily Activity Short Form  -   Putting on and taking off regular lower body clothing?: A Little  -   Bathing (including washing, rinsing, drying)?: A Little  -   Toileting, which includes using toilet, bedpan or urinal? : A Little  -   Putting on and taking off regular upper body clothing?: None  -   Taking care of personal grooming such as brushing teeth?: None  -   Eating meals?: None    AM-PAC Score:  Score: 21  Approx Degree of Impairment: 32.79%  Standardized Score (AM-PAC Scale): 44.27    PLAN     OT Treatment Plan: Balance activities, Energy conservation/work simplification techniques, ADL training, IADL training, Continued evaluation, Compensatory technique education, Equipment eval/education, Patient/Family training, Patient/Family education, Endurance training, UE strengthening/ROM, Functional transfer training  Rehab Potential : Good  Frequency: 3-5x/week    OT Goals:   PROGRESSING 4/10  ADL Goals   Patient will perform upper body dressing:  with supervision  Patient will perform lower body dressing:  with supervision  Patient will perform toileting: with supervision     Functional Transfer Goals  Patient will transfer from supine to sit:  with supervision  Patient will transfer from sit to stand:  with supervision  Patient will transfer to toilet:  with supervision     UE Exercise Program Goal  Patient will be supervision  with bilateral AROM HEP (home exercise program).     Additional Goals:  Pt will verbalize at least 3 energy conservation techniques  Pt will stand at sink for 5 minutes to complete grooming routine    OT Session Time: 18 minutes  Self-Care Home Management: 5 minutes  Therapeutic Activity: 10 minutes

## 2025-04-11 NOTE — PROGRESS NOTES
[facility]     Adina Jefferson Patient Status:  Inpatient    10/2/2000 MRN [id number]   [facility] 6NE-A Attending Mustapha Booker MD   Hosp Day # 3 PCP No primary care provider on file.     Chief Complaint: Head trauma     Subjective:     Patient doing well.  Pain controlled.  No n/v, no other complaints.      Objective:    Review of Systems:   A comprehensive review of systems was completed; pertinent positive and negatives stated in subjective.    Vital signs:  Temp:  [97.9 °F (36.6 °C)-98.6 °F (37 °C)] 97.9 °F (36.6 °C)  Pulse:  [] 104  Resp:  [11-22] 17  BP: (100-129)/(68-90) 117/89  SpO2:  [94 %-100 %] 98 %    Physical Exam:    General: No acute distress  HEENT:  Surgical incision noted, c/d/i  Respiratory: No wheezes, no rhonchi  Cardiovascular: S1, S2, regular rate and rhythm  Abdomen: Soft, Non-tender, non-distended, positive bowel sounds  Neuro: No new focal deficits.   Extremities: No edema    Diagnostic Data:    Labs:  Recent Labs   Lab 25  1621 04/08/25  2117 04/09/25  0417 04/10/25  0431   WBC 12.6* 22.7* 16.3* 13.5*   HGB 12.6 11.2* 10.4* 10.2*   MCV 93.9 92.5 94.8 95.0   .0 209.0 182.0 173.0   INR 1.05  --   --   --        Recent Labs   Lab 25  1621 257 04/10/25  0432   * 150* 127* 90   BUN 15 13 11 8*   CREATSERUM 1.04* 0.84 0.76 0.79   CA 9.5 8.2* 7.7* 8.7   ALB 4.6  --   --   --     139 141 143   K 3.3* 4.3 4.3 4.0    109 109 108   CO2 25.0 21.0 24.0 28.0   ALKPHO 59  --   --   --    AST 24  --   --   --    ALT 10  --   --   --    BILT 0.3  --   --   --    TP 7.1  --   --   --        Estimated Glomerular Filtration Rate: 108 mL/min/1.73m2 (result from lab).    No results for input(s): \"TROP\", \"TROPHS\", \"CK\" in the last 168 hours.    Recent Labs   Lab 25  1621   PTP 13.8   INR 1.05                  Microbiology    No results found for this visit on  04/08/25.      Imaging: Reviewed in Epic.    Medications: Scheduled Medications[1]    Assessment & Plan:      #Left hemisphere Epidural Hematoma  S/p craniotomy with evacuation   Neurosurgery and NeuroCC followinng  PT/OT/ST and frequent neurochecks  Keppra for Seizure precautions - 7 days total  Pain control  Drains in place - possible removal today, per neurosurgery     #Multiple skull fractures  Reviewed CT imaging   S/p sports injury     #Anemia  Hg 11.2 -> 10.4 -> 10.2 post procedure  No e/o of acute blood loss    #Leukocytosis  Likely reactive  Afebrile, monitor off abx at this time  Appropriately downtrending            Mustapha Booker MD    Supplementary Documentation:     Quality:  DVT Mechanical Prophylaxis:   SCDs,    DVT Pharmacologic Prophylaxis   Medication   None                Code Status: Full Code  Agarwal: No urinary catheter in place  Agarwal Duration (in days): 2  Central line:    BRITNEY:     Discharge is dependent on: surgery recovery   At this point Ms. Jefferson is expected to be discharge to: Home    The 21st Century Cures Act makes medical notes like these available to patients in the interest of transparency. Please be advised this is a medical document. Medical documents are intended to carry relevant information, facts as evident, and the clinical opinion of the practitioner. The medical note is intended as peer to peer communication and may appear blunt or direct. It is written in medical language and may contain abbreviations or verbiage that are unfamiliar.                         [1]    sertraline  75 mg Oral Daily    levETIRAcetam  500 mg Oral BID    pantoprazole  40 mg Intravenous QAM AC    Or    pantoprazole  40 mg Oral QAM AC

## 2025-04-11 NOTE — PROGRESS NOTES
[facility]  Neurosurgery Progress Note    Adina Jefferson Patient Status:  Inpatient    10/2/2000 MRN [id number]   Location [facility] 6NE-A Attending Mustapha Booker MD   Hosp Day # 3 PCP No primary care provider on file.     Chief Complaint:  Epidural hematoma  Skull Fracture  AMS    POD #3 s/p left craniotomy for evacuation of epidural hematoma, elevation of depressed skull fracture    Subjective:  No acute events overnight. Minimal output from drain.     Objective/Physical Exam:    Vital Signs:  Blood pressure 103/75, pulse 72, temperature 97.9 °F (36.6 °C), temperature source Temporal, resp. rate 14, height 67\", weight 120 lb 1.6 oz (54.5 kg), SpO2 97%.  Respiratory:  nonlabored  CV:  well perfused  General: NAD, Speech Fluent, Mood Appropriate  Neurologic:   A&Ox3  PERRL, EOMi, FS, TM  Full strength x 4, no drift  Skin: Staples intact, no active oozing noted.    Output by Drain (mL) 25 - 25 190 - 04/10/25 0659 04/10/25 07 - 04/10/25 1859 04/10/25 190 - 25 0659 25 07 - 25 0917   Closed Drain Left Scalp Accordion 10 Fr. 70 60 35 25          Labs:  Recent Labs   Lab 25  1621 25  0417 04/10/25  0431   RBC 3.94 3.48* 3.28* 3.22*   HGB 12.6 11.2* 10.4* 10.2*   HCT 37.0 32.2* 31.1* 30.6*   MCV 93.9 92.5 94.8 95.0   MCH 32.0 32.2 31.7 31.7   MCHC 34.1 34.8 33.4 33.3   RDW 11.0 11.2 11.0 11.3   NEPRELIM 7.30 20.88* 15.17*  --    WBC 12.6* 22.7* 16.3* 13.5*   .0 209.0 182.0 173.0       Recent Labs   Lab 25  0417 04/10/25  0432   * 127* 90   BUN 13 11 8*   CREATSERUM 0.84 0.76 0.79   EGFRCR 101 114 108   CA 8.2* 7.7* 8.7    141 143   K 4.3 4.3 4.0    109 108   CO2 21.0 24.0 28.0       Imaging:  CTA BRAIN + CTA CAROTIDS (CPT=70496/71296)  Result Date: 2025  CONCLUSION:   1. No evidence of acute arterial injury. No evidence of intracranial aneurysm, flow-limiting stenosis, or  focal arterial occlusion.  2. No evidence of occlusion, dissection, or flow-limiting stenosis in the cervical vertebral or carotid arteries. No evidence of hemodynamically significant carotid stenosis by NASCET criteria.  3. Interval left pterional craniotomy changes noted.  There has been evacuation of the previously visualized epidural hematoma along the left cerebral convexity and extending into the left middle cranial fossa.  There is scattered minimal residual blood products and postprocedural pneumocephalus seen.  There is decreased mass effect.  There is decreased depression of the previously visualized left temporal bone fracture fragments.  Surgical clips are seen in the left scalp along with soft tissue gas and  swelling along the left scalp.  4. Fracture planes are again noted extending into the bilateral greater sphenoid wings, floor of the left middle cranial fossa, sphenoid sinuses, sella, bilateral carotid canals, bilateral temporomandibular joints.   Please see above for further details.  LOCATION:  [facility]   Dictated by (CST): Hermelindo Taylor MD on 4/08/2025 at 10:51 PM     Finalized by (CST): Hermelindo Taylor MD on 4/08/2025 at 10:56 PM       CT FACIAL BONES (CPT=70486)  Result Date: 4/8/2025  CONCLUSION:   1. Redemonstration of comminuted fractures of the left temporal bone as well as the bilateral greater sphenoid wings.  Fracture planes extend into the bilateral sphenoid sinuses where there are air-fluid levels.  Fracture planes extend into the bilateral  carotid canals as well as the floor of the left middle cranial fossa.  Fracture planes extend into the bilateral temporomandibular joints.  Fracture planes extend into the base of the sella turcica.  2. Partially imaged postoperative changes from recent left pterional craniotomy.  Postoperative gas in the left temporal scalp soft tissues along with soft tissue swelling.    LOCATION:  [facility]   Dictated by (CST): Hermelindo Taylor MD on 4/08/2025 at  10:34 PM     Finalized by (CST): Hermelindo Taylor MD on 4/08/2025 at 10:38 PM       US INTRAOPERATIVE GUIDANCE (CPT=76998)  Result Date: 4/8/2025  CONCLUSION:  Sonographic image(s) obtained and submitted during craniotomy procedure.  No radiologist was present for the exam.  Correlation with real-time sonography and operative report are recommended.   LOCATION:  [id number]    Dictated by (CST): Nate Harding MD on 4/08/2025 at 8:07 PM     Finalized by (CST): Nate Harding MD on 4/08/2025 at 8:08 PM       CT BRAIN OR HEAD (CPT=70450)  Result Date: 4/8/2025  CONCLUSION:   1. Comminuted left temporal bone fracture noted with depression of multiple fragments.  Fracture planes also extend into the bilateral greater sphenoid wings, floor of the left middle cranial fossa, sphenoid sinuses, sella, bilateral carotid canals, bilateral temporomandibular joints. Soft tissue laceration in the adjacent left temporal scalp along with soft tissue swelling.  2. There is an extra-axial hemorrhage along the lateral left cerebral convexity, most pronounced along the left temporal lobe as well as the floor of the left middle cranial fossa measuring up to 10 mm in thickness.  This is most likely in the epidural space.  There is mild local mass effect.  Critical results were discussed with Dr. Bedoya at 1648 hours on 4/8/2025. Critical results were read back.   LOCATION:  [facility]   Dictated by (CST): Hermelindo Taylor MD on 4/08/2025 at 4:42 PM     Finalized by (CST): Hermelindo Taylor MD on 4/08/2025 at 4:51 PM       CT SPINE CERVICAL (CPT=72125)  Result Date: 4/8/2025  CONCLUSION:  No evidence of acute fracture of the cervical spine.  No significant bony central canal or neural foraminal stenosis is seen.  If there is persistent clinical concern then recommend follow-up imaging with MRI.    LOCATION:  [id number]   Dictated by (CST): Quoc Zimmerman MD on 4/08/2025 at 4:42 PM     Finalized by (CST): Quoc Zimmerman MD on 4/08/2025 at 4:45 PM         Assessment:  25 yo female who sustained a left epidural hematoma after an injury playing softball s/p craniotomy for hematoma evacuation.    Plan:  Medical management per NCC/ Hospitalist  Keppra x7 days  Pain meds PRN  Will remove drain today.  DVT prophylaxis SCD, Can start chemoprophlaxis 48 hours post surgery      Is this a shared or split note between Advanced Practice Provider and Physician? Yes       TOD Arciniega  [facility]  4/11/2025  [place] [id number]

## 2025-04-11 NOTE — DISCHARGE INSTRUCTIONS
HOME CARE INSTRUCTIONS FOLLOWING CRANIOTOMY      Activity:  Do NOT drive until cleared by your surgeon.    Do NOT operate heavy machinery while under the influence of narcotic pain medications or muscle relaxants.  Ambulate several times a day.  Short distances are better at first.  Gradually increase activity level as tolerated.  Avoid prolonged immobility, as this may lead to complications such as pneumonia, blood clots, or pulmonary embolism.  No lifting more than 10 lbs ( a gallon of milk).  Avoid straining or bending at the waist.  No heavy exercising until cleared by surgeon.  You may resume sexual activity when you feel well enough.    What is Normal?  Headache or incisional pain is normal after your craniotomy.  This typically lasts a few days to a few weeks   Facial or eye swelling is common after a craniotomy.  This may take a few days to a week to resolve.  Elevating your head can help with swelling, such as sleeping on additional pillows or sitting upright.  Bruising can also occur and may take several weeks to fully resolve  Increased fatigue or tiredness following surgery is common.  This usually lasts approximately 1 to 3 weeks.  Make sure you are getting plenty of rest.    Medications:  Pain medications have been prescribed to you for severe pain.  If the pain is not severe, you may take over the counter (OTC) Extra Strength Tylenol for pain relief.    Narcotic pain medications may cause constipation.  OTC stool softeners and laxatives such as Colace, Miralax, or Milk of Magnesia can be taken for constipation.  If you continue to have constipation despite OTC medications, notify your surgeon.  Do NOT take Aspirin or NSAIDS (Ibuprofen, Aleve, Advil, Motrin, or Naproxen) until cleared by surgeon.   You may be given a muscle relaxant such as Flexeril (cyclobenzaprine), Zanaflex (tizanidine), or Valium.  These medications may cause drowsiness - do not drive or operate machinery while taking these  medications.   If you take blood thinners such as Coumadin (warfarin), Plavix (clopidogrel), Xarelto (rivaroxaban),  Eliquis (apixaban), or Pradaxa (dabigatran), your surgeon will clarify when it is safe to resume these medications.    Wound Care  You may have sutures or staples in place.  You will have a follow up appointment in 10-14 days for a wound check or removal of staples or sutures.  OK to shower and wash hair on 4/12/25.  Use mild shampoo with using your fingertips.  When showering make sure that you head/incision is that last area to get wet and the first area to dry.  Lightly pat dry with a clean towel.     When to call your surgeon:  Shortness of breath, chest pain, unilateral leg swelling  Fever of 101 F or higher  New or worsening seizures  Severe persistent headaches unrelieved by pain medications or rest  New or worsening speech difficulties, visual problems, numbness or tingling, or weakness  Uncontrolled nausea or vomiting  Redness, drainage, swelling, or warmth of the incision

## 2025-04-11 NOTE — PHYSICAL THERAPY NOTE
PHYSICAL THERAPY TREATMENT NOTE - INPATIENT    Room Number: 6601/6601-A     Session: 2     Number of Visits to Meet Established Goals: 3  History related to current admission: Patient is a 24 year old female admitted on 4/8/2025 from Home for Epidural Hematoma. Presented after colliding with another  as she was sliding into the base dx depressed skull fx s/p L crani for evac of epidural hematoma and elevation of depressed skull fx 4/8. Drain removed 4/11     HOME SITUATION  Type of Home: Other (Comment) (dorm room)  Home Layout: One level                     Lives With: Roommate    Drives: Yes           Prior Level of [place]: Pt states that she is IND with all ADLs and Gait. Plans on going to OT school once she graduates. Additionally plans to dc home with parents.     Presenting Problem: Epidural Hematoma  Co-Morbidities : none    PHYSICAL THERAPY ASSESSMENT   Patient demonstrates good  progress this session, goals  all addressed    Patient has met all skilled IPPT goals at this time. Patient will be discharged from Physical Therapy services.  Please re-order if a new functional limitation presents during this admission.      Patient continues to benefit from continued skilled PT services: at discharge to promote prior level of function.  Anticipate patient will return home with OP PT.    PLAN DURING HOSPITALIZATION  Nursing Mobility Recommendation : 1 Assist  PT Device Recommendation: Rolling walker  PT Treatment Plan: Bed mobility, Body mechanics, Gait training, Strengthening, Stair training, Transfer training, Balance training  Frequency (Obs): 3-5x/week     CURRENT GOALS     Goal #1 Patient is able to demonstrate supine - sit EOB @ level: supervision   MET 4/10   Goal #2 Patient is able to demonstrate transfers EOB to/from BSC at assistance level: supervision   MET 4/10   Goal #3 Patient is able to ambulate 150 feet with assist device: walker - rolling at assistance level:  supervision  Updated 4/10 - ambulate 150ft w/o device and SBA - MET       Goal #4  Patient will participate in sanchez balance asessment -  addressed with higher level balance training    Goal #5     Goal #6     Goal Comments: Goals established on 2025 all goals addressed    SUBJECTIVE  Pt continues to report some vision issues she attributes to not wearing her glasses and usually only wearing one contact      OBJECTIVE  Precautions:  (-150)    WEIGHT BEARING RESTRICTION     PAIN ASSESSMENT   Ratin  Location: does not c.o pain  Management Techniques: Activity promotion, Body mechanics, Repositioning    BALANCE                                                                                                                       Static Sitting: Good  Dynamic Sitting: Good           Static Standing: Good  Dynamic Standing: Good    ACTIVITY TOLERANCE  Pulse: 110  Heart Rate Source: Monitor                   O2 WALK       AM-PAC '6-Clicks' INPATIENT SHORT FORM - BASIC MOBILITY  How much difficulty does the patient currently have...  Patient Difficulty: Turning over in bed (including adjusting bedclothes, sheets and blankets)?: None   Patient Difficulty: Sitting down on and standing up from a chair with arms (e.g., wheelchair, bedside commode, etc.): None   Patient Difficulty: Moving from lying on back to sitting on the side of the bed?: None   How much help from another person does the patient currently need...   Help from Another: Moving to and from a bed to a chair (including a wheelchair)?: None   Help from Another: Need to walk in hospital room?: None   Help from Another: Climbing 3-5 steps with a railing?: None     AM-PAC Score:  Raw Score: 24   Approx Degree of Impairment: 0%   Standardized Score (AM-PAC Scale): 61.14   CMS Modifier (G-Code): CH    FUNCTIONAL ABILITY STATUS  Gait Assessment   Functional Mobility/Gait Assessment  Gait Assistance: Supervision  Distance (ft): 300  Assistive  Device: None  Pattern: Within Functional Limits  Stairs: Stairs  How Many Stairs: 12  Device: 1 Rail  Assist: Supervision  Pattern: Ascend and Descend  Ascend and Descend : Reciprocal    Skilled Therapy Provided    Bed Mobility:  Rolling: indep   Supine<>Sit: indep   Sit<>Supine: NT     Transfer Mobility:  Sit<>Stand: SBA   Stand<>Sit: SBA   Gait: SBA      Therapist's Comments: Discussed case with RN prior to session initiation. Pt agreeable to participation in therapy. Gait belt donned for out of bed mobility. Able to participate in higher level balance training.Reviewed body mechanics and compensatory strategies. Navigated stairs again w/o difficulty. Discussed following up with OP therapy once cleared by neuro surg.     Lateral stepping 25ft x2  Tandem stepping 25ft x2  Ambulating with horizontal and vertical head turns           Patient End of Session: Up in chair, Needs met, Call light within reach, RN aware of session/findings, All patient questions and concerns addressed, Hospital anti-slip socks, Family present    PT Session Time: 23 minutes  Gait Trainin minutes  Therapeutic Activity:  minutes  Therapeutic Exercise:  minutes   Neuromuscular Re-education:  minutes

## 2025-04-11 NOTE — PLAN OF CARE
Assumed pt care at 0730. Pt a/o x4 and resting in bed. Neuros intact. See flowsheet. L scalp hemovac intact with minimal bloody output. L head staples jacob. No drainage. VSS. NSR. Pt c/o moderate incisional pain with relief from pain medication. Up as tolerated. Pt and family updated with poc. Transfer orders.     1120 L scalp hemovac removed by NS NP.

## 2025-04-11 NOTE — PLAN OF CARE
Assumed care of pt around 1930. NQ4H, pt A&Ox4, intact. See Complex Neuro Assessment for more details. L head hemovac intact, incision site cleansed this AM, Bacitracin applied, GAYLE. L eye facial swelling improved over night. VSS, NSR on tele. Maintaining SBP . Pt c/o mild to moderate incisional pain with mild relief from PRN pain medications. Ambulating in webb/bathroom this evening with RN, tolerated well. Pt and family updated on plan of care.       Problem: NEUROLOGICAL - ADULT  Goal: Achieves stable or improved neurological status  Description: INTERVENTIONS- Assess for and report changes in neurological status- Initiate measures to prevent increased intracranial pressure- Maintain blood pressure and fluid volume within ordered parameters to optimize cerebral perfusion and minimize risk of hemorrhage- Monitor temperature, glucose, and sodium. Initiate appropriate interventions as ordered  Outcome: Progressing  Goal: Absence of seizures  Description: INTERVENTIONS- Monitor for seizure activity- Administer anti-seizure medications as ordered- Monitor neurological status  Outcome: Progressing  Goal: Remains free of injury related to seizure activity  Description: INTERVENTIONS:- Maintain airway, patient safety  and administer oxygen as ordered- Monitor patient for seizure activity, document and report duration and description of seizure to MD/LIP- If seizure occurs, turn patient to side and suction secretions as needed- Reorient patient post seizure- Seizure pads on all 4 side rails- Instruct patient/family to notify RN of any seizure activity- Instruct patient/family to call for assistance with activity based on assessment  Outcome: Progressing  Goal: Achieves maximal functionality and self care  Description: INTERVENTIONS- Monitor swallowing and airway patency with patient fatigue and changes in neurological status- Encourage and assist patient to increase activity and self care with guidance from PT/OT-  Encourage visually impaired, hearing impaired and aphasic patients to use assistive/communication devices  Outcome: Progressing     Problem: PAIN - ADULT  Goal: Verbalizes/displays adequate comfort level or patient's stated pain goal  Description: INTERVENTIONS:- Encourage pt to monitor pain and request assistance- Assess pain using appropriate pain scale- Administer analgesics based on type and severity of pain and evaluate response- Implement non-pharmacological measures as appropriate and evaluate response- Consider cultural and social influences on pain and pain management- Manage/alleviate anxiety- Utilize distraction and/or relaxation techniques- Monitor for opioid side effects- Notify MD/LIP if interventions unsuccessful or patient reports new pain- Anticipate increased pain with activity and pre-medicate as appropriate  Outcome: Progressing

## 2025-04-11 NOTE — PROCEDURES
Subgaleal drain removed at bedside.  Catheter tip intact on drain removal.  Site secured with yee stitch.  Site is C/D/I without drainage, erythema or edema.   Patient tolerated removal well and was appreciative.      Neuro checks q1 hour x 6 hours.

## 2025-04-11 NOTE — PAYOR COMM NOTE
--------------  :  CONTINUED STAY REVIEW    Payor: [facility] PP  Subscriber #:  [id number]  Authorization Number: [id number]    Admit date: 25  Admit time:  9:01 PM      Neurosurgery Progress Note           Adina Jefferson Patient Status:  Inpatient    10/2/2000 MRN [id number]   [facility] 6NE-A Attending Mustapha Booker MD   Hosp Day # 3 PCP No primary care provider on file.      Chief Complaint:  Epidural hematoma  Skull Fracture  AMS     POD #3 s/p left craniotomy for evacuation of epidural hematoma, elevation of depressed skull fracture     Subjective:  No acute events overnight. Minimal output from drain.      Objective/Physical Exam:     Vital Signs:  Blood pressure 103/75, pulse 72, temperature 97.9 °F (36.6 °C), temperature source Temporal, resp. rate 14, height 67\", weight 120 lb 1.6 oz (54.5 kg), SpO2 97%.  Respiratory:  nonlabored  CV:  well perfused  General: NAD, Speech Fluent, Mood Appropriate  Neurologic:   A&Ox3  PERRL, EOMi, FS, TM  Full strength x 4, no drift  Skin: Staples intact, no active oozing noted.     Output by Drain (mL) 25 07 - 25 1859 25 - 04/10/25 0659 04/10/25 07 - 04/10/25 1859 04/10/25 1900 - 25 0659 25 07 - 25 0917   Closed Drain Left Scalp Accordion 10 Fr. 70 60 35 25              Labs:         Recent Labs   Lab 25  1621 25  2117 25  0417 04/10/25  0431   RBC 3.94 3.48* 3.28* 3.22*   HGB 12.6 11.2* 10.4* 10.2*   HCT 37.0 32.2* 31.1* 30.6*   MCV 93.9 92.5 94.8 95.0   MCH 32.0 32.2 31.7 31.7   MCHC 34.1 34.8 33.4 33.3   RDW 11.0 11.2 11.0 11.3   NEPRELIM 7.30 20.88* 15.17*  --    WBC 12.6* 22.7* 16.3* 13.5*   .0 209.0 182.0 173.0               Recent Labs   Lab 257 257 04/10/25  0432   * 127* 90   BUN 13 11 8*   CREATSERUM 0.84 0.76 0.79   EGFRCR 101 114 108   CA 8.2* 7.7* 8.7    141 143   K 4.3 4.3 4.0    109 108   CO2 21.0 24.0 28.0         Imaging:  CTA  BRAIN + CTA CAROTIDS (CPT=70496/80613)  Result Date: 4/8/2025  CONCLUSION:   1. No evidence of acute arterial injury. No evidence of intracranial aneurysm, flow-limiting stenosis, or focal arterial occlusion.  2. No evidence of occlusion, dissection, or flow-limiting stenosis in the cervical vertebral or carotid arteries. No evidence of hemodynamically significant carotid stenosis by NASCET criteria.  3. Interval left pterional craniotomy changes noted.  There has been evacuation of the previously visualized epidural hematoma along the left cerebral convexity and extending into the left middle cranial fossa.  There is scattered minimal residual blood products and postprocedural pneumocephalus seen.  There is decreased mass effect.  There is decreased depression of the previously visualized left temporal bone fracture fragments.  Surgical clips are seen in the left scalp along with soft tissue gas and  swelling along the left scalp.  4. Fracture planes are again noted extending into the bilateral greater sphenoid wings, floor of the left middle cranial fossa, sphenoid sinuses, sella, bilateral carotid canals, bilateral temporomandibular joints.   Please see above for further details.  LOCATION:  [facility]   Dictated by (CST): Hermelindo Taylor MD on 4/08/2025 at 10:51 PM     Finalized by (CST): Hermelindo Taylor MD on 4/08/2025 at 10:56 PM        CT FACIAL BONES (CPT=70486)  Result Date: 4/8/2025  CONCLUSION:   1. Redemonstration of comminuted fractures of the left temporal bone as well as the bilateral greater sphenoid wings.  Fracture planes extend into the bilateral sphenoid sinuses where there are air-fluid levels.  Fracture planes extend into the bilateral  carotid canals as well as the floor of the left middle cranial fossa.  Fracture planes extend into the bilateral temporomandibular joints.  Fracture planes extend into the base of the sella turcica.  2. Partially imaged postoperative changes from recent left  pterional craniotomy.  Postoperative gas in the left temporal scalp soft tissues along with soft tissue swelling.    LOCATION:  [facility]   Dictated by (CST): Hermelindo Taylor MD on 4/08/2025 at 10:34 PM     Finalized by (CST): Hermelindo Taylor MD on 4/08/2025 at 10:38 PM        US INTRAOPERATIVE GUIDANCE (CPT=76998)  Result Date: 4/8/2025  CONCLUSION:  Sonographic image(s) obtained and submitted during craniotomy procedure.  No radiologist was present for the exam.  Correlation with real-time sonography and operative report are recommended.   LOCATION:  [id number]    Dictated by (CST): Nate Harding MD on 4/08/2025 at 8:07 PM     Finalized by (CST): Nate Harding MD on 4/08/2025 at 8:08 PM        CT BRAIN OR HEAD (CPT=70450)  Result Date: 4/8/2025  CONCLUSION:   1. Comminuted left temporal bone fracture noted with depression of multiple fragments.  Fracture planes also extend into the bilateral greater sphenoid wings, floor of the left middle cranial fossa, sphenoid sinuses, sella, bilateral carotid canals, bilateral temporomandibular joints. Soft tissue laceration in the adjacent left temporal scalp along with soft tissue swelling.  2. There is an extra-axial hemorrhage along the lateral left cerebral convexity, most pronounced along the left temporal lobe as well as the floor of the left middle cranial fossa measuring up to 10 mm in thickness.  This is most likely in the epidural space.  There is mild local mass effect.  Critical results were discussed with Dr. Bedoya at 1648 hours on 4/8/2025. Critical results were read back.   LOCATION:  [facility]   Dictated by (CST): Hermelindo Taylor MD on 4/08/2025 at 4:42 PM     Finalized by (CST): Hermelindo Taylor MD on 4/08/2025 at 4:51 PM        CT SPINE CERVICAL (CPT=72125)  Result Date: 4/8/2025  CONCLUSION:  No evidence of acute fracture of the cervical spine.  No significant bony central canal or neural foraminal stenosis is seen.  If there is persistent clinical concern then  recommend follow-up imaging with MRI.    LOCATION:  [id number]   Dictated by (CST): Quoc Zimmerman MD on 4/08/2025 at 4:42 PM     Finalized by (CST): Quoc Zimmerman MD on 4/08/2025 at 4:45 PM        Assessment:  23 yo female who sustained a left epidural hematoma after an injury playing softball s/p craniotomy for hematoma evacuation.     Plan:  Medical management per NCC/ Hospitalist  Keppra x7 days  Pain meds PRN  Will remove drain today.  DVT prophylaxis SCD, Can start chemoprophlaxis 48 hours post surgery        Is this a shared or split note between Advanced Practice Provider and Physician? Yes         TOD Arciniega  [facility]  4/11/2025  Spectre [id number]                      MEDICATIONS ADMINISTERED IN LAST 1 DAY:  butalbital-acetaminophen-caffeine (Fioricet) -40 MG per tab 1 tablet       Date Action Dose Route User    4/11/2025 0844 Given 1 tablet Oral Julienne Cardenas RN    4/11/2025 0346 Given 1 tablet Oral Doni Gooden RN    4/10/2025 2317 Given 1 tablet Oral Doni Gooden RN    4/10/2025 1944 Given 1 tablet Oral Doni Gooden RN    4/10/2025 1504 Given 1 tablet Oral Julienne Cardenas RN          HYDROmorphone (Dilaudid) 1 MG/ML injection 0.4 mg       Date Action Dose Route User    4/11/2025 1057 Given 0.4 mg Intravenous Julienne Cardenas RN    4/11/2025 0602 Given 0.4 mg Intravenous Doni Gooden RN    4/11/2025 0138 Given 0.4 mg Intravenous Doni Gooden RN    4/10/2025 1745 Given 0.4 mg Intravenous Julienne Cardenas RN          levETIRAcetam (Keppra) tab 500 mg       Date Action Dose Route User    4/11/2025 0844 Given 500 mg Oral Julienne Cardenas RN    4/10/2025 2110 Given 500 mg Oral Doni Gooden RN          oxyCODONE immediate release tab 2.5 mg       Date Action Dose Route User    4/11/2025 0945 Given 2.5 mg Oral Julienne Cardenas RN    4/11/2025 0526 Given 2.5 mg Oral Doni Gooden RN          oxyCODONE immediate release tab 5 mg        Date Action Dose Route User    4/11/2025 0209 Given 5 mg Oral Doni Gooden RN    4/10/2025 2110 Given 5 mg Oral Doni Gooden RN    4/10/2025 1704 Given 5 mg Oral Juilenne Cardenas RN    4/10/2025 1230 Given 5 mg Oral Julienne Cardenas RN          pantoprazole (Protonix) DR tab 40 mg       Date Action Dose Route User    4/11/2025 0712 Given 40 mg Oral Doni Gooden RN          sertraline (Zoloft) tab 75 mg       Date Action Dose Route User    4/11/2025 0843 Given 75 mg Oral Julienne Cardenas RN            Vitals (last day)       Date/Time Temp Pulse Resp BP SpO2 Weight O2 Device O2 Flow Rate (L/min) [facility]    04/11/25 1100 -- 86 22 117/82 97 % -- -- -- AM 04/11/25 1000 -- 104 17 117/89 98 % -- -- -- AM 04/11/25 0900 -- 82 17 111/78 98 % -- -- -- AM 04/11/25 0800 97.9 °F (36.6 °C) 72 14 103/75 97 % -- None (Room air) -- AM 04/11/25 0700 -- 70 14 104/75 96 % -- -- -- FS 04/11/25 0600 -- 79 14 117/82 100 % -- -- -- FS 04/11/25 0500 -- 83 22 118/79 97 % 120 lb 1.6 oz (54.5 kg) -- -- FS 04/11/25 0400 98.4 °F (36.9 °C) 67 15 112/83 98 % -- None (Room air) -- FS 04/11/25 0300 -- 73 15 107/78 95 % -- -- -- FS 04/11/25 0200 -- 67 14 110/77 95 % -- -- -- FS 04/11/25 0100 -- 69 18 110/78 96 % -- -- -- FS 04/11/25 0000 98.6 °F (37 °C) 78 16 110/78 96 % -- None (Room air) -- FS    04/10/25 2300 -- 75 20 108/77 96 % -- -- -- FS    04/10/25 2200 -- 74 14 110/76 96 % -- -- -- FS    04/10/25 2100 -- 82 12 100/68 100 % -- -- -- FS    04/10/25 2000 97.9 °F (36.6 °C) 75 15 116/76 98 % -- None (Room air) -- FS    04/10/25 1900 -- 91 13 116/90 98 % -- -- -- AM    04/10/25 1800 -- 79 16 111/84 98 % -- -- -- AM    04/10/25 1700 -- 105 18 115/81 100 % -- -- -- AM    04/10/25 1600 98 °F (36.7 °C) 76 15 113/78 98 % -- None (Room air) -- AM    04/10/25 1500 -- 83 13 119/85 100 % -- -- -- AM    04/10/25 1400 -- 82 20 120/80 99 % -- -- -- AM    04/10/25 1300 -- 80 17 129/87 96 % -- -- -- AM     04/10/25 1200 98.1 °F (36.7 °C) 86 18 121/87 99 % -- None (Room air) -- AM    04/10/25 1145 -- 87 12 123/82 -- -- -- -- CH    04/10/25 1100 -- 76 11 121/85 94 % -- -- -- AM    04/10/25 1000 -- 80 13 122/83 98 % -- -- -- AM    04/10/25 0900 -- 75 15 121/79 97 % -- -- -- AM    04/10/25 0800 98.1 °F (36.7 °C) 81 12 108/72 100 % -- None (Room air) -- AM    04/10/25 0700 -- 71 17 112/75 97 % -- -- -- FS    04/10/25 0600 -- 73 13 121/85 98 % -- -- -- FS    04/10/25 0500 -- 72 15 105/77 97 % 120 lb 14.4 oz (54.8 kg) -- -- FS    04/10/25 0400 98.1 °F (36.7 °C) 82 16 113/80 97 % -- None (Room air) -- FS    04/10/25 0300 -- 76 14 106/69 97 % -- -- -- FS    04/10/25 0200 -- 78 15 107/69 95 % -- -- -- FS    04/10/25 0100 -- 78 15 112/78 96 % -- -- -- FS    04/10/25 0000 98.6 °F (37 °C) 68 13 108/77 97 % -- None (Room air) -- FS

## 2025-04-12 NOTE — PLAN OF CARE
Assumed care @1600  VSS,   A&Ox4, neuro's intact.  RA  NSR ,   Pain in head and incision managed by prn oxy and fioricet,   Up with standby assist as tolerated.    Tolerating Regular diet.    Staples c/d/i    Updated POC with patient and family.

## 2025-04-12 NOTE — PROGRESS NOTES
[facility]   part of [facility]    Neurosurgery Progress Note  2025    Adina Jefferson Patient Status:  Inpatient    10/2/2000 MRN [id number]   Location [facility] 6NE-A Attending Mustapha Booker MD   Hosp Day # 4 PCP No primary care provider on file.     SUBJECTIVE:  Adina Jefferson is a(n) 24 year old female postoperative day 4, left frontotemporal craniotomy for elevation of depressed skull fracture and evacuation of epidural hematoma.    The patient is making outstanding progress.  No complaints at present.      OBJECTIVE / PHYSICAL EXAM:  Vital Signs:  Blood pressure 114/77, pulse 79, temperature 98.9 °F (37.2 °C), temperature source Temporal, resp. rate 26, height 67\", weight 119 lb 3.2 oz (54.1 kg), SpO2 97%.  She is neurologically intact on examination.  Oriented x 3.  No drift.  Incision is clean, dry, and intact.      Lab Results (last 24 hours):  No results found for this or any previous visit (from the past 24 hours).    Review of Imaging  No new imaging    Assessment/Plan:  1.  Left epidural hematoma with depressed skull fracture, status post craniotomy.    Overall, the patient is making outstanding progress.  Okay for floor transfer.    Anticipate discharge home in the next 24-48 hours, pending clinical progress.    I had a conversation with the patient and her parents regarding the excellent overall prognosis, as well as the expectation of some lingering symptoms over the next several months, due to postconcussive syndrome.  All questions were answered.        2025  11:30 AM    This report was dictated using voice recognition technology.  Errors in syntax or recognition may occur, and should not be construed to change the meaning of a sentence.

## 2025-04-12 NOTE — PROGRESS NOTES
[facility]   [facility] Progress Note     Adina Jefferson Patient Status:  Inpatient    10/2/2000 MRN [id number]   Location [facility] 6NE-A Attending Mustapha Booker MD   Hosp Day # 4 PCP No primary care provider on file.     Chief Complaint: Head trauma     Subjective:     Patient is doing well.  Pain is better.  Denies fever, chills, n/v.  No other complaints.      Objective:    Review of Systems:   A comprehensive review of systems was completed; pertinent positive and negatives stated in subjective.    Vital signs:  Temp:  [98.9 °F (37.2 °C)-99.9 °F (37.7 °C)] 99 °F (37.2 °C)  Pulse:  [] 95  Resp:  [13-26] 26  BP: ()/(54-86) 100/73  SpO2:  [95 %-99 %] 99 %    Physical Exam:    General: No acute distress  HEENT:  Surgical incision noted, c/d/i  Respiratory: No wheezes, no rhonchi  Cardiovascular: S1, S2, regular rate and rhythm  Abdomen: Soft, Non-tender, non-distended, positive bowel sounds  Neuro: No new focal deficits.   Extremities: No edema    Diagnostic Data:    Labs:  Recent Labs   Lab 25  1621 04/08/25  2117 04/09/25  0417 04/10/25  0431   WBC 12.6* 22.7* 16.3* 13.5*   HGB 12.6 11.2* 10.4* 10.2*   MCV 93.9 92.5 94.8 95.0   .0 209.0 182.0 173.0   INR 1.05  --   --   --        Recent Labs   Lab 25  1621 04/08/25  2117 04/09/25  0417 04/10/25  0432   * 150* 127* 90   BUN 15 13 11 8*   CREATSERUM 1.04* 0.84 0.76 0.79   CA 9.5 8.2* 7.7* 8.7   ALB 4.6  --   --   --     139 141 143   K 3.3* 4.3 4.3 4.0    109 109 108   CO2 25.0 21.0 24.0 28.0   ALKPHO 59  --   --   --    AST 24  --   --   --    ALT 10  --   --   --    BILT 0.3  --   --   --    TP 7.1  --   --   --        Estimated Glomerular Filtration Rate: 108 mL/min/1.73m2 (result from lab).    No results for input(s): \"TROP\", \"TROPHS\", \"CK\" in the last 168 hours.    Recent Labs   Lab 25  1621   PTP 13.8   INR 1.05                  Microbiology    No results found for  this visit on 04/08/25.      Imaging: Reviewed in Epic.    Medications: Scheduled Medications[1]    Assessment & Plan:      #Left hemisphere Epidural Hematoma  S/p craniotomy with evacuation   Neurosurgery and NeuroCC followinng  PT/OT/ST and frequent neurochecks  Keppra for Seizure precautions - 7 days total  Pain control  Drains removed on 4/11    #Multiple skull fractures  Reviewed CT imaging   S/p sports injury     #Anemia  Hg 11.2 -> 10.4 -> 10.2 post procedure  No e/o of acute blood loss    #Leukocytosis  Likely reactive  Afebrile, monitor off abx at this time  Appropriately downtrending            Mustapha Booker MD    Supplementary Documentation:     Quality:  DVT Mechanical Prophylaxis:   SCDs,    DVT Pharmacologic Prophylaxis   Medication   None                Code Status: Full Code  Agarwal: No urinary catheter in place  Agarwal Duration (in days): 2  Central line:    BRITNEY:     Discharge is dependent on: surgery recovery   At this point Ms. Jefferson is expected to be discharge to: Home    The 21st Century Cures Act makes medical notes like these available to patients in the interest of transparency. Please be advised this is a medical document. Medical documents are intended to carry relevant information, facts as evident, and the clinical opinion of the practitioner. The medical note is intended as peer to peer communication and may appear blunt or direct. It is written in medical language and may contain abbreviations or verbiage that are unfamiliar.                         [1]    sertraline  75 mg Oral Daily    levETIRAcetam  500 mg Oral BID    pantoprazole  40 mg Intravenous QAM AC    Or    pantoprazole  40 mg Oral QAM AC

## 2025-04-12 NOTE — PROGRESS NOTES
Assumed care @ 1930.    Pt a/o x4, neuro checks every 4 hours.  No neuro deficits.  VSS.  Tele SR.    No acute respiratory distress noted on room air.  C/O HA, relieved with PRN Fioricet, Oxy, and Dilaudid.    Lt side head incision with staples, open to air.  Bacitracin applied.  Pt ambulated to the bathroom with standby assist.    (-) BM.  Voids per toilet.  No other complaints made.    Tele orders.

## 2025-04-12 NOTE — PLAN OF CARE
Assumed care of patient around 0730.  Neuro checks every 4 hours, intact and unchanged.  Left head incision noted, staples intact.  Pain well controlled with PRN medications.  Ambulating in room, up to bathroom without difficulty.  Remains on RA. SBP maintained >90. SR on monitors.  Family at bedside.    Bed assignment received. Transferred to room 7622 with all belongings.

## 2025-04-13 VITALS
HEIGHT: 67 IN | RESPIRATION RATE: 14 BRPM | OXYGEN SATURATION: 100 % | HEART RATE: 79 BPM | BODY MASS INDEX: 18.71 KG/M2 | TEMPERATURE: 98 F | WEIGHT: 119.19 LBS | SYSTOLIC BLOOD PRESSURE: 116 MMHG | DIASTOLIC BLOOD PRESSURE: 71 MMHG

## 2025-04-13 LAB
ERYTHROCYTE [DISTWIDTH] IN BLOOD BY AUTOMATED COUNT: 11.3 %
HCT VFR BLD AUTO: 34.2 % (ref 35–48)
HGB BLD-MCNC: 11.7 G/DL (ref 12–16)
MCH RBC QN AUTO: 31.5 PG (ref 26–34)
MCHC RBC AUTO-ENTMCNC: 34.2 G/DL (ref 31–37)
MCV RBC AUTO: 91.9 FL (ref 80–100)
PLATELET # BLD AUTO: 245 10(3)UL (ref 150–450)
RBC # BLD AUTO: 3.72 X10(6)UL (ref 3.8–5.3)
WBC # BLD AUTO: 8.1 X10(3) UL (ref 4–11)

## 2025-04-13 RX ORDER — OXYCODONE HYDROCHLORIDE 5 MG/1
5 TABLET ORAL EVERY 6 HOURS PRN
Qty: 20 TABLET | Refills: 0 | Status: SHIPPED | OUTPATIENT
Start: 2025-04-13

## 2025-04-13 RX ORDER — BUTALBITAL, ACETAMINOPHEN AND CAFFEINE 50; 325; 40 MG/1; MG/1; MG/1
1 TABLET ORAL EVERY 6 HOURS PRN
Qty: 10 TABLET | Refills: 0 | Status: SHIPPED | OUTPATIENT
Start: 2025-04-13

## 2025-04-13 RX ORDER — LEVETIRACETAM 500 MG/1
500 TABLET ORAL 2 TIMES DAILY
Qty: 4 TABLET | Refills: 0 | Status: SHIPPED | OUTPATIENT
Start: 2025-04-13 | End: 2025-04-15

## 2025-04-13 NOTE — PROGRESS NOTES
[facility]   part of [facility]    Neurosurgery Progress Note  2025    Adina Jefferson Patient Status:  Inpatient    10/2/2000 MRN [id number]   Location [facility] 7NE-A Attending Mustapha Booker MD   Hosp Day # 5 PCP No primary care provider on file.     SUBJECTIVE:  Adina Jefferson is a(n) 24 year old female postoperative day 5, left frontotemporal craniotomy with elevation of depressed skull fracture and evacuation of epidural hematoma.    The patient is making outstanding progress.  No complaints today.      OBJECTIVE / PHYSICAL EXAM:  Vital Signs:  Blood pressure 109/72, pulse 91, temperature 98.2 °F (36.8 °C), temperature source Oral, resp. rate 14, height 67\", weight 119 lb 3.2 oz (54.1 kg), SpO2 97%.  On examination, she remains neurologically intact.  Incision is clean, dry, and intact.      Lab Results (last 24 hours):  Recent Results (from the past 24 hours)   CBC, Platelet; No Differential    Collection Time: 25  8:51 AM   Result Value Ref Range    WBC 8.1 4.0 - 11.0 x10(3) uL    RBC 3.72 (L) 3.80 - 5.30 x10(6)uL    HGB 11.7 (L) 12.0 - 16.0 g/dL    HCT 34.2 (L) 35.0 - 48.0 %    .0 150.0 - 450.0 10(3)uL    MCV 91.9 80.0 - 100.0 fL    MCH 31.5 26.0 - 34.0 pg    MCHC 34.2 31.0 - 37.0 g/dL    RDW 11.3 %       Review of Imaging  No new imaging    Assessment/Plan:  1.  Left depressed skull fracture and epidural hematoma, status post elevation of skull fracture with evacuation of epidural hematoma.  The patient is making outstanding progress.    She is stable for discharge.    Upon discharge, continue Keppra for another a 48 hours.  Will prescribe pain medications for discharge as well.  Standard activity recommendations reviewed.    Follow-up in neurosurgery [facility] clinic, 7-10 days, for staple removal.        2025  11:20 AM    This report was dictated using voice recognition technology.  Errors in syntax or recognition may occur, and should not be construed to change the meaning of  a sentence.

## 2025-04-13 NOTE — DISCHARGE SUMMARY
[facility]     Adina Jefferson Patient Status:  Inpatient    10/2/2000 MRN [id number]   Location [facility] 7NE-A Attending Mustapha Booker MD   Hosp Day # 5 PCP No primary care provider on file.     Date of Admission: 2025  Date of Discharge:   2025    Discharge Disposition: Final discharge disposition not confirmed    Discharge Diagnosis:    #Left hemisphere Epidural Hematoma  #Multiple skull fractures  #Anemia  #Leukocytosis    History of Present Illness: Adina Jefferson is a 24 year old female with past medical history of anxiety and depression presents emergency room after playing a game of softball where the patient collided with another player and was struck in the head with a cleat.  Patient was arousable slow to respond.  She does not know who her parents are aware she is at or what happened.  She does not know her last name and does not understand simple objects.  She does know her first name and her date of birth.  No fevers, chills, diarrhea.  No loss of bladder or bowel function.  No back pain, abdominal pain, chest pain.    Brief Synopsis:   Patient presented after a sports injury with left hemisphere after levodopa.  She underwent left craniotomy and evacuation of epidural hematoma on .  Patient underwent usual postoperative care including antiepileptics for pain control.  Her drains were removed on .  Patient was cleared for discharge today by neurosurgery.  Patient to follow-up with them in the next 1 to 2 weeks.  Will get repeat CT scan in the outpatient setting.  Patient also encouraged to follow-up with PCP, she needs to establish with a new one.  All questions and concerns were addressed with patient prior to discharge, she is agreeable to plan of care as stated, she is encouraged to return to the ER if symptoms come back or worsen.    Lace+ Score: 29  59-90 High Risk  29-58 Medium Risk  0-28   Low Risk       TCM Follow-Up Recommendation:  LACE 29-58:  Moderate Risk of readmission after discharge from the hospital.      Procedures during hospitalization:   Left Craniotomy for evacuation of epidural hematoma - 4/8    Consultants:  Neurosurgery, neuroCC, general surgery     Discharge Medication List:     Discharge Medications        START taking these medications        Instructions Prescription details   levETIRAcetam 500 MG Tabs  Commonly known as: Keppra      Take 1 tablet (500 mg total) by mouth 2 (two) times daily for 2 days.   Stop taking on: April 15, 2025  Quantity: 4 tablet  Refills: 0     oxyCODONE 5 MG Tabs  Notes to patient: Last given today 4/13 @1200      Take 1 tablet (5 mg total) by mouth every 6 (six) hours as needed.   Quantity: 20 tablet  Refills: 0            CONTINUE taking these medications        Instructions Prescription details   Norgestim-Eth Estrad Triphasic 0.18/0.215/0.25 MG-35 MCG Tabs  Notes to patient: Take as directed before       Refills: 0     sertraline 50 MG Tabs  Commonly known as: Zoloft      Take 1.5 tablets (75 mg total) by mouth in the morning.   Refills: 0               Where to Get Your Medications        These medications were sent to [facility] [address] [place] [facility] [place], [place] - [address] [phone], [phone]  [address] [facility] [address] [facility] [address]      Phone: [phone]   levETIRAcetam 500 MG Tabs  oxyCODONE 5 MG Tabs         [place]: [place]    Follow-up appointment:   Andre, Kristen, PA  [address]  [place] [address] [place] [id number] [phone]    Follow up  Please get head CT before your visit.    Primary care provider    Follow up in 1 week(s)      Appointments for Next 30 Days 4/13/2025 - 5/13/2025      None            Vital signs:  Temp:  [98 °F (36.7 °C)-98.6 °F (37 °C)] 98.2 °F (36.8 °C)  Pulse:  [70-96] 91  Resp:  [14-23] 14  BP: ()/(55-76) 109/72  SpO2:  [95 %-98 %] 97 %    Physical Exam:    General: No acute distress  HEENT:  Surgical incision noted, c/d/i  Respiratory: No wheezes, no  rhonchi  Cardiovascular: S1, S2, regular rate and rhythm  Abdomen: Soft, Non-tender, non-distended, positive bowel sounds  Neuro: No new focal deficits.   Extremities: No edema    -----------------------------------------------------------------------------------------------  PATIENT DISCHARGE INSTRUCTIONS: See electronic chart    Mustapha Booker MD    Total time spent on discharge plannin minutes     The  Century Cures Act makes medical notes like these available to patients in the interest of transparency. Please be advised this is a medical document. Medical documents are intended to carry relevant information, facts as evident, and the clinical opinion of the practitioner. The medical note is intended as peer to peer communication and may appear blunt or direct. It is written in medical language and may contain abbreviations or verbiage that are unfamiliar.

## 2025-04-13 NOTE — PLAN OF CARE
Assumed care at 0730  Orientated x4, NSR, RA   Complaints of headache     Nq4; no new neuro deficits   BP within parameters   Up to hallway   Needs met     Plan for discharge once cleared by consults         NURSING DISCHARGE NOTE    Discharged Home via Ambulatory.  Accompanied by Support staff  Belongings Taken by patient/family.    All consults cleared for discharge   Patient and family educated on discharge paperwork   No further questions

## 2025-04-13 NOTE — PLAN OF CARE
Assumed pt care at 1930  A&Ox4, able to make needs known  Neuro checks Q4hrs, no changes  Pain managed w/ Oxy and Fioricet. Dilaudid IV x1 given before bedtime  Staples on head intact  Father at bedside  Call light in reach  Bed in low position

## 2025-04-14 ENCOUNTER — PATIENT OUTREACH (OUTPATIENT)
Dept: CASE MANAGEMENT | Age: 25
End: 2025-04-14

## 2025-04-14 ENCOUNTER — TELEPHONE (OUTPATIENT)
Dept: SURGERY | Facility: CLINIC | Age: 25
End: 2025-04-14

## 2025-04-14 NOTE — TELEPHONE ENCOUNTER
----- Message from Kristen Andre sent at 4/13/2025 12:21 PM CDT -----  Regarding: Schedule follow-up visit  Hello,Please schedule this patient for postop follow-up visit with me on 4/21 for 2-week postop follow-up, s/p craniotomy with Dr. Herrera.  Patient should complete outpatient head CT as ordered.Thank you,Shellie

## 2025-04-14 NOTE — PROGRESS NOTES
Left message on mailbox for patient to call care manager back for transitional care management/hospital follow-up call.  Care  information included in message.    2nd attempt

## 2025-04-14 NOTE — PAYOR COMM NOTE
--------------  DISCHARGE REVIEW    Payor: [facility]  Subscriber #:  [id number]  Authorization Number: [id number]    Admit date: 25  Admit time:   9:01 PM  Discharge Date: 2025  3:52 PM     Admitting Physician: Shiva Sullivan MD  Attending Physician:  No att. providers found  Primary Care Physician: No primary care provider on file.          Discharge Summary Notes        Discharge Summary signed by Mustapha Booker MD at 2025  1:18 PM       Author: Mustapha Booker MD Specialty: HOSPITALIST Author Type: Physician    Filed: 2025  1:18 PM Date of Service: 2025  1:14 PM Status: Signed    : Mustapha Booker MD (Physician)           [facility]  DISCHARGE SUMMARY     Adina Jefferson Patient Status:  Inpatient    10/2/2000 MRN [id number]   [facility] 7NE-A Attending Mustapha Booker MD   Hosp Day # 5 PCP No primary care provider on file.     Date of Admission: 2025  Date of Discharge:   2025    Discharge Disposition: Final discharge disposition not confirmed    Discharge Diagnosis:    #Left hemisphere Epidural Hematoma  #Multiple skull fractures  #Anemia  #Leukocytosis    History of Present Illness: Adina Jefferson is a 24 year old female with past medical history of anxiety and depression presents emergency room after playing a game of softball where the patient collided with another player and was struck in the head with a cleat.  Patient was arousable slow to respond.  She does not know who her parents are aware she is at or what happened.  She does not know her last name and does not understand simple objects.  She does know her first name and her date of birth.  No fevers, chills, diarrhea.  No loss of bladder or bowel function.  No back pain, abdominal pain, chest pain.    Brief Synopsis:   Patient presented after a sports injury with left hemisphere after levodopa.  She underwent left craniotomy and evacuation of epidural hematoma on .  Patient underwent usual  postoperative care including antiepileptics for pain control.  Her drains were removed on 4/11.  Patient was cleared for discharge today by neurosurgery.  Patient to follow-up with them in the next 1 to 2 weeks.  Will get repeat CT scan in the outpatient setting.  Patient also encouraged to follow-up with PCP, she needs to establish with a new one.  All questions and concerns were addressed with patient prior to discharge, she is agreeable to plan of care as stated, she is encouraged to return to the ER if symptoms come back or worsen.    Lace+ Score: 29  59-90 High Risk  29-58 Medium Risk  0-28   Low Risk       TCM Follow-Up Recommendation:  LACE 29-58: Moderate Risk of readmission after discharge from the hospital.      Procedures during hospitalization:   Left Craniotomy for evacuation of epidural hematoma - 4/8    Consultants:  Neurosurgery, neuroCC, general surgery     Discharge Medication List:     Discharge Medications        START taking these medications        Instructions Prescription details   levETIRAcetam 500 MG Tabs  Commonly known as: Keppra      Take 1 tablet (500 mg total) by mouth 2 (two) times daily for 2 days.   Stop taking on: April 15, 2025  Quantity: 4 tablet  Refills: 0     oxyCODONE 5 MG Tabs  Notes to patient: Last given today 4/13 @1200      Take 1 tablet (5 mg total) by mouth every 6 (six) hours as needed.   Quantity: 20 tablet  Refills: 0            CONTINUE taking these medications        Instructions Prescription details   Norgestim-Eth Estrad Triphasic 0.18/0.215/0.25 MG-35 MCG Tabs  Notes to patient: Take as directed before       Refills: 0     sertraline 50 MG Tabs  Commonly known as: Zoloft      Take 1.5 tablets (75 mg total) by mouth in the morning.   Refills: 0               Where to Get Your Medications        These medications were sent to [id number] - [place], [place] - [address] [phone], [phone]  [address], [place] [address]      Phone:  [phone]   levETIRAcetam 500 MG Tabs  oxyCODONE 5 MG Tabs         [place]    Follow-up appointment:   Andre, Kristen, PA  [address]  [address]  [place] [address] [phone]    Follow up  Please get head CT before your visit.    Primary care provider    Follow up in 1 week(s)      Appointments for Next 30 Days 2025 - 2025      None            Vital signs:  Temp:  [98 °F (36.7 °C)-98.6 °F (37 °C)] 98.2 °F (36.8 °C)  Pulse:  [70-96] 91  Resp:  [14-23] 14  BP: ()/(55-76) 109/72  SpO2:  [95 %-98 %] 97 %    Physical Exam:    General: No acute distress  HEENT:  Surgical incision noted, c/d/i  Respiratory: No wheezes, no rhonchi  Cardiovascular: S1, S2, regular rate and rhythm  Abdomen: Soft, Non-tender, non-distended, positive bowel sounds  Neuro: No new focal deficits.   Extremities: No edema    -----------------------------------------------------------------------------------------------  PATIENT DISCHARGE INSTRUCTIONS: See electronic chart    Mustapha Booker MD    Total time spent on discharge plannin minutes     The  [employer] Cures Act makes medical notes like these available to patients in the interest of transparency. Please be advised this is a medical document. Medical documents are intended to carry relevant information, facts as evident, and the clinical opinion of the practitioner. The medical note is intended as peer to peer communication and may appear blunt or direct. It is written in medical language and may contain abbreviations or verbiage that are unfamiliar.       Electronically signed by Mustapha Booker MD on 2025  1:18 PM         REVIEWER COMMENTS

## 2025-04-14 NOTE — PAYOR COMM NOTE
--------------  4/12 CONTINUED STAY REVIEW    Payor: [facility]  Subscriber #:  [id number]  Authorization Number: [id number]    NEUROSURG:    Adina Jefferson is a(n) 24 year old female postoperative day 4, left frontotemporal craniotomy for elevation of depressed skull fracture and evacuation of epidural hematoma.     The patient is making outstanding progress.  No complaints at present.      OBJECTIVE / PHYSICAL EXAM:  Vital Signs:  Blood pressure 114/77, pulse 79, temperature 98.9 °F (37.2 °C), temperature source Temporal, resp. rate 26, height 67\", weight 119 lb 3.2 oz (54.1 kg), SpO2 97%.  She is neurologically intact on examination.  Oriented x 3.  No drift.  Incision is clean, dry, and intact.      Scheduled Medications  as of 4/12/2025 1:56 PM   sertraline  75 mg Oral Daily    levETIRAcetam  500 mg Oral BID    pantoprazole  40 mg Intravenous QAM AC     Or    pantoprazole  40 mg Oral QAM AC       Assessment/Plan:  1.  Left epidural hematoma with depressed skull fracture, status post craniotomy.     Overall, the patient is making outstanding progress.  Okay for floor transfer.     Anticipate discharge home in the next 24-48 hours, pending clinical progress.       MEDICATIONS ADMINISTERED IN LAST 1 DAY:  butalbital-acetaminophen-caffeine (Fioricet) -40 MG per tab 1 tablet       Date Action Dose Route User    Discharged on 4/13/2025 4/13/2025 1506 Given 1 tablet Oral Mirna Cho RN            Vitals (last day) before discharge       Date/Time Temp Pulse Resp BP SpO2 Weight O2 Device O2 Flow Rate (L/min) Who    04/13/25 1200 98.2 °F (36.8 °C) 79 14 116/71 100 % -- None (Room air) -- CV    04/13/25 0800 98.2 °F (36.8 °C) 91 14 109/72 -- -- None (Room air) -- CV    04/13/25 0400 98.1 °F (36.7 °C) 70 18 91/58 97 % -- None (Room air) -- SV    04/13/25 0000 98 °F (36.7 °C) 76 17 90/55 95 % -- None (Room air) -- SV    04/12/25 2000 98.1 °F (36.7 °C) 87 18 100/59 98 % -- None (Room air) --     04/12/25 1600  98.6 °F (37 °C) 96 23 110/76 98 % -- None (Room air) -- PP    04/12/25 1200 99 °F (37.2 °C) 95 26 100/73 99 % -- None (Room air) -- MH    04/12/25 0800 98.9 °F (37.2 °C) 79 26 114/77 97 % -- None (Room air) -- MH    04/12/25 0700 -- 96 18 101/73 95 % -- -- -- TK    04/12/25 0600 -- 68 14 101/66 95 % -- -- -- TK    04/12/25 0500 -- 74 21 111/83 98 % -- -- -- TK    04/12/25 0400 98.9 °F (37.2 °C) 82 20 105/54 96 % 119 lb 3.2 oz (54.1 kg) None (Room air) -- TK    04/12/25 0300 -- 73 19 105/71 96 % -- -- -- TK    04/12/25 0200 -- 98 13 98/55 99 % -- -- -- TK    04/12/25 0100 -- 74 17 105/65 96 % -- -- -- TK    04/12/25 0000 99.2 °F (37.3 °C) 71 20 112/78 97 % -- -- -- TK

## 2025-04-15 ENCOUNTER — TELEPHONE (OUTPATIENT)
Dept: SURGERY | Facility: CLINIC | Age: 25
End: 2025-04-15

## 2025-04-15 PROBLEM — D62 ABLA (ACUTE BLOOD LOSS ANEMIA): Status: ACTIVE | Noted: 2025-04-15

## 2025-04-15 PROBLEM — Z98.890 S/P CRANIOTOMY: Status: ACTIVE | Noted: 2025-04-15

## 2025-04-15 PROBLEM — Z98.890 S/P EVACUATION OF HEMATOMA: Status: ACTIVE | Noted: 2025-04-15

## 2025-04-15 PROBLEM — S02.91XD: Status: ACTIVE | Noted: 2025-04-15

## 2025-04-15 PROBLEM — E87.6 HYPOKALEMIA: Status: ACTIVE | Noted: 2025-04-15

## 2025-04-15 NOTE — TELEPHONE ENCOUNTER
Patient called back and was scheduled at her convenience. Patient scheduled after upcoming CT    Future Appointments   Date Time Provider Department Center   4/24/2025  1:00 PM  CT MAIN RM3  [facility]   4/30/2025 10:45 AM Kristen Andre PA [facility] NEURSURG [facility]

## 2025-04-15 NOTE — PROGRESS NOTES
Transitional Care Management   Discharge Date: 25  Contact Date: 2025    Assessment:  TCM Initial Assessment    General:  Assessment completed with: Parents  Patient Subjective: Pt doing fairly well per parent.  Chief Complaint: Epidural hematoma  Verify patient name and  with patient/ caregiver: Yes    Hospital Stay/Discharge:  Prior to leaving the hospital were your Discharge Instructions reviewed with you?: Yes  Did you receive a copy of your written Discharge Instructions?: Yes  Do you feel better or worse since you left the hospital or emergency department?: Better    Follow - Up Appointment:  Do you have a follow-up appointment?: No  Are there any barriers to getting to your follow-up appointment?: No    Home Health/DME:  Prior to leaving the hospital was Home Health (HH) arranged for you?: No     Prior to leaving the hospital or emergency department was Durable Medical Equipment (DME), medical supplies, or infusions arranged for you?: No  Are DME/medical supply/infusions needs identified by staff during this assessment?: No     Medications/Diet:       Were you given a different diet per your Discharge Instructions?: No     Questions/Concerns:  Do you have any questions or concerns that have not been discussed?: No         Follow-up Appointments:      Transitional [facility] Clinic  Was TCC Ordered: No  Was TCC Scheduled: Yes   - If yes: [x]  Advised patient to bring all medications and blood glucose meter/supplies if applicable.    Specialist  Does the patient have any other follow-up appointment(s) that need to be scheduled? Yes   -If yes: Care Manager reviewed upcoming specialist appointments with patient: Yes   -Does the patient need assistance scheduling appointment(s): Yes, message sent to TST team      Book By Date: 25

## 2025-04-15 NOTE — TELEPHONE ENCOUNTER
Patient needs to be seen sooner for staple removal.  Please schedule patient to see me at 0915 on Tuesday 4/22.  I would also advise patient to move the head CT appointment to Monday or Tuesday early morning prior to her office visit, so that we can review at her visit.  Please advise.

## 2025-04-15 NOTE — TELEPHONE ENCOUNTER
Patient's mother would like to confirm if 4/30/25 appointment is soon enough to have staples removed? Surgery was on 4/8/25

## 2025-04-15 NOTE — TELEPHONE ENCOUNTER
Message below noted.    Patient had surgery 4/08/25 for left craniotomy. Patient has post-op follow-up scheduled 4/30/25. CT scheduled 4/24/25. Patient mother Barbara requesting if patient should be seen sooner for staple removal.    Discharge Summary 4/13/25  \"Follow-up appointment:   Andre, Kristen, PA  [address]  [address] [place] [id number] [phone]     Follow up  Please get head CT before your visit.     Primary care provider     Follow up in 1 week(s)\"    Routed to Provider.

## 2025-04-16 ENCOUNTER — OFFICE VISIT (OUTPATIENT)
Dept: INTERNAL MEDICINE CLINIC | Facility: CLINIC | Age: 25
End: 2025-04-16
Payer: COMMERCIAL

## 2025-04-16 VITALS
HEIGHT: 65 IN | HEART RATE: 72 BPM | WEIGHT: 119 LBS | SYSTOLIC BLOOD PRESSURE: 118 MMHG | OXYGEN SATURATION: 99 % | BODY MASS INDEX: 19.83 KG/M2 | TEMPERATURE: 98 F | DIASTOLIC BLOOD PRESSURE: 76 MMHG | RESPIRATION RATE: 14 BRPM

## 2025-04-16 DIAGNOSIS — D62 ABLA (ACUTE BLOOD LOSS ANEMIA): ICD-10-CM

## 2025-04-16 DIAGNOSIS — R41.82 ALTERED MENTAL STATUS, UNSPECIFIED ALTERED MENTAL STATUS TYPE: ICD-10-CM

## 2025-04-16 DIAGNOSIS — S02.91XA CLOSED DEPRESSED FRACTURE OF SKULL, INITIAL ENCOUNTER (HCC): ICD-10-CM

## 2025-04-16 DIAGNOSIS — E87.6 HYPOKALEMIA: ICD-10-CM

## 2025-04-16 DIAGNOSIS — S02.91XD: ICD-10-CM

## 2025-04-16 DIAGNOSIS — Z98.890 S/P EVACUATION OF HEMATOMA: ICD-10-CM

## 2025-04-16 DIAGNOSIS — S06.4XAA EPIDURAL HEMATOMA (HCC): Primary | ICD-10-CM

## 2025-04-16 DIAGNOSIS — Z98.890 S/P CRANIOTOMY: ICD-10-CM

## 2025-04-16 PROCEDURE — 3074F SYST BP LT 130 MM HG: CPT | Performed by: NURSE PRACTITIONER

## 2025-04-16 PROCEDURE — 99495 TRANSJ CARE MGMT MOD F2F 14D: CPT | Performed by: NURSE PRACTITIONER

## 2025-04-16 PROCEDURE — 3078F DIAST BP <80 MM HG: CPT | Performed by: NURSE PRACTITIONER

## 2025-04-16 PROCEDURE — 3008F BODY MASS INDEX DOCD: CPT | Performed by: NURSE PRACTITIONER

## 2025-04-16 RX ORDER — BUTALBITAL, ACETAMINOPHEN AND CAFFEINE 50; 325; 40 MG/1; MG/1; MG/1
1 TABLET ORAL EVERY 6 HOURS PRN
Qty: 5 TABLET | Refills: 0 | Status: SHIPPED | OUTPATIENT
Start: 2025-04-16

## 2025-04-16 NOTE — PROGRESS NOTES
TCM VISIT  [facility] TRANSITIONAL CARE CLINIC      HISTORY   HPI: Adina Jefferson is a 24 year old female here today for hospital follow up for epidural hematoma, closed depressed fracture of the skull, altered mental status, s/p craniotomy/evacuation of hematoma, ABLA, hypokalemia.  dAina Jefferson was discharged from [facility]  to Home or Self Care.  Admit Date: 4/8/25. Discharge Date: 4/13/25.     Hospital Discharge Diagnosis:    #Left hemisphere Epidural Hematoma  #Multiple skull fractures  #Anemia  #Leukocytosis      Hospital stay was uncomplicated.  Adina Jefferson was discharge with Keppra, oxycodone, Tylenol ES, Fioricet, no NSAIDs, postop restrictions and a referral to the [facility] for continued follow up.      Today, patient is being seen for hospital follow-up.  She reports doing okay, better since discharge.  She is accompanied by her parents at time of exam.    She presented to ED after playing a game of softball where the patient collided with another player and was struck in the head with a cleat.  She was arousable but slow to respond.  She did not know who her parents were or aware of where she was at or what happened.  She did not know her last name, did not understand simple objects.  She did know her first name and her date of birth.  She presented after a sports injury with left hemisphere hematoma.  She underwent left craniotomy and evacuation of epidural hematoma on 4/8.  Had usual postoperative care including antiepileptic for pain control.  Her drain was removed on 4/11.  She was cleared for discharge by neurosurgery.  She is to follow-up with them in 1-2 weeks.  Will need repeat CT scan in the outpatient setting.  She was cleared by consultants and discharged home in stable condition.    Interactive contact within 2 business days post discharge first initiated on Date: 4/14/2025      Allergies:  Allergies[1]   Current Meds:  Current Outpatient Medications   Medication Sig  Dispense Refill    levETIRAcetam 500 MG Oral Tab Take 1 tablet (500 mg total) by mouth 2 (two) times daily for 2 days. 4 tablet 0    oxyCODONE 5 MG Oral Tab Take 1 tablet (5 mg total) by mouth every 6 (six) hours as needed. 20 tablet 0    butalbital-acetaminophen-caffeine -40 MG Oral Tab Take 1 tablet by mouth every 6 (six) hours as needed for Headaches. 10 tablet 0    sertraline 50 MG Oral Tab Take 1.5 tablets (75 mg total) by mouth in the morning.      Norgestim-Eth Estrad Triphasic 0.18/0.215/0.25 MG-35 MCG Oral Tab        No current facility-administered medications for this visit.       HISTORY: reconciled and reviewed with patient  Past Medical History[2]   Past Surgical History[3]   Family History[4]   Short Social Hx on File[5]     Imaging & Consults:  CT BRAIN OR HEAD (CPT=70450)  Result Date: 4/19/2025  PROCEDURE:  CT BRAIN OR HEAD (18702)  COMPARISON:  [facility] , CT, CT BRAIN OR HEAD (62929), 4/08/2025, 4:32 PM.  [facility] , CT, CTA BRAIN + CTA CAROTIDS (CPT=70496/88721), 4/08/2025, 10:15 PM.  INDICATIONS:  S06.4XAA Epidural hematoma (HCC) S06.5XAA Acute subdural hematoma (HCC)  TECHNIQUE:  Noncontrast CT scanning is performed through the brain. Dose reduction techniques were used. Dose information is transmitted to the ACR (American College of Radiology) NRDR (National Radiology Data Registry) which includes the Dose Index Registry.  PATIENT STATED HISTORY: (As transcribed by Technologist)  Previous brain injury. Interval exam.    FINDINGS: Sequelae of left pterional craniotomy is again noted.  Minimal extra-axial density adjacent to the craniotomy site measures approximately 3 mm.  Expected evolution of post craniotomy changes is noted.  Pneumocephalus is no longer identified.  No new abnormality in the brain parenchyma.  Ventricles and sulci are appropriate for the patient's age.  No mass effect.  Visualized portions of paranasal sinuses are unremarkable.  Visualized portions of mastoid air cells are  unremarkable.  Visualized portions of orbits are unremarkable.  IMPRESSION: The SPECT lead evolution of previously noted left epidural hemorrhage along with postoperative left pterional craniotomy changes.    LOCATION:  [facility]   Dictated by (CST): Gerber Gamboa MD on 4/19/2025 at 12:43 PM     Finalized by (CST): Gerber Gamboa MD on 4/19/2025 at 12:45 PM       CTA BRAIN + CTA CAROTIDS (CPT=70496/29056)  Result Date: 4/8/2025  CONCLUSION:   1. No evidence of acute arterial injury. No evidence of intracranial aneurysm, flow-limiting stenosis, or focal arterial occlusion.  2. No evidence of occlusion, dissection, or flow-limiting stenosis in the cervical vertebral or carotid arteries. No evidence of hemodynamically significant carotid stenosis by NASCET criteria.  3. Interval left pterional craniotomy changes noted.  There has been evacuation of the previously visualized epidural hematoma along the left cerebral convexity and extending into the left middle cranial fossa.  There is scattered minimal residual blood products and postprocedural pneumocephalus seen.  There is decreased mass effect.  There is decreased depression of the previously visualized left temporal bone fracture fragments.  Surgical clips are seen in the left scalp along with soft tissue gas and  swelling along the left scalp.  4. Fracture planes are again noted extending into the bilateral greater sphenoid wings, floor of the left middle cranial fossa, sphenoid sinuses, sella, bilateral carotid canals, bilateral temporomandibular joints.   Please see above for further details.  LOCATION:  [facility]   Dictated by (CST): Hermelindo Taylor MD on 4/08/2025 at 10:51 PM     Finalized by (CST): Heremlindo Taylor MD on 4/08/2025 at 10:56 PM       CT FACIAL BONES (CPT=70486)  Result Date: 4/8/2025  CONCLUSION:   1. Redemonstration of comminuted fractures of the left temporal bone as well as the bilateral greater sphenoid wings.  Fracture planes extend into  the bilateral sphenoid sinuses where there are air-fluid levels.  Fracture planes extend into the bilateral  carotid canals as well as the floor of the left middle cranial fossa.  Fracture planes extend into the bilateral temporomandibular joints.  Fracture planes extend into the base of the sella turcica.  2. Partially imaged postoperative changes from recent left pterional craniotomy.  Postoperative gas in the left temporal scalp soft tissues along with soft tissue swelling.    LOCATION:  [facility]   Dictated by (CST): Hermelindo Taylor MD on 4/08/2025 at 10:34 PM     Finalized by (CST): Hermelindo Taylor MD on 4/08/2025 at 10:38 PM       US INTRAOPERATIVE GUIDANCE (CPT=76998)  Result Date: 4/8/2025  CONCLUSION:  Sonographic image(s) obtained and submitted during craniotomy procedure.  No radiologist was present for the exam.  Correlation with real-time sonography and operative report are recommended.   LOCATION:  [facility]    Dictated by ([facility]): Nate Harding MD on 4/08/2025 at 8:07 PM     Finalized by (CST): Nate Harding MD on 4/08/2025 at 8:08 PM       CT BRAIN OR HEAD (CPT=70450)  Result Date: 4/8/2025  CONCLUSION:   1. Comminuted left temporal bone fracture noted with depression of multiple fragments.  Fracture planes also extend into the bilateral greater sphenoid wings, floor of the left middle cranial fossa, sphenoid sinuses, sella, bilateral carotid canals, bilateral temporomandibular joints. Soft tissue laceration in the adjacent left temporal scalp along with soft tissue swelling.  2. There is an extra-axial hemorrhage along the lateral left cerebral convexity, most pronounced along the left temporal lobe as well as the floor of the left middle cranial fossa measuring up to 10 mm in thickness.  This is most likely in the epidural space.  There is mild local mass effect.  Critical results were discussed with Dr. Bedoya at 1648 hours on 4/8/2025. Critical results were read back.   LOCATION:  [facility]   Dictated by  (CST): Hermelindo Taylor MD on 4/08/2025 at 4:42 PM     Finalized by (CST): Hermelindo Taylor MD on 4/08/2025 at 4:51 PM       CT SPINE CERVICAL (CPT=72125)  Result Date: 4/8/2025  CONCLUSION:  No evidence of acute fracture of the cervical spine.  No significant bony central canal or neural foraminal stenosis is seen.  If there is persistent clinical concern then recommend follow-up imaging with MRI.    LOCATION:  [id number]   Dictated by (CST): Quoc Zimmerman MD on 4/08/2025 at 4:42 PM     Finalized by (CST): Quoc Zimmerman MD on 4/08/2025 at 4:45 PM         Lab Results   Component Value Date/Time    WBC 8.1 04/13/2025 08:51 AM    HGB 11.7 (L) 04/13/2025 08:51 AM    HCT 34.2 (L) 04/13/2025 08:51 AM    .0 04/13/2025 08:51 AM    GLU 90 04/10/2025 04:32 AM     04/10/2025 04:32 AM    K 4.0 04/10/2025 04:32 AM     04/10/2025 04:32 AM    CO2 28.0 04/10/2025 04:32 AM    BUN 8 (L) 04/10/2025 04:32 AM    CREATSERUM 0.79 04/10/2025 04:32 AM    CA 8.7 04/10/2025 04:32 AM    ALB 4.6 04/08/2025 04:21 PM    ALT 10 04/08/2025 04:21 PM    AST 24 04/08/2025 04:21 PM    INR 1.05 04/08/2025 04:21 PM       Immunization History   Administered Date(s) Administered    Covid-19 Vaccine Pfizer 30 mcg/0.3 ml 04/07/2021, 05/01/2021       ROS:  GENERAL: energy fair/stable, denies fever, weakness  SKIN: denies any skin changes, + left scalp incision with staples intact D/I and GAYLE, + drain site x 1 with staple intact  EYES: denies blurred vision, eye pain  HEENT: denies ear pain, loss of hearing, sore throat, + mild left facial swelling, + jaw pain  RESPIRATORY: denies cough, denies dyspnea with exertion  CARDIOVASCULAR: denies syncope, chest pain, fatigue, palpitations   GI: denies abdominal pain, melena, constipation, diarrhea, nausea, vomiting   MUSCULOSKELETAL: denies pain, states normal range of motion of extremities  NEUROLOGIC: denies confusion, balance difficulty, + forgetful when tired, + headache when tired  PSYCHIATRIC: + Hx  depression or anxiety  HEMATOLOGIC: + mild anemia, denies bruising, bleeding    PHYSICAL EXAM:  Vitals:    04/16/25 0715   BP: 118/76   Pulse: 72   Resp: 14   Temp: 97.8 °F (36.6 °C)       GENERAL: well developed, well nourished, in no apparent distress, good historian  INTEGUMENTARY: warm, dry, no rashes, + left scalp incision with staples intact D/I and GAYLE, + drain site x 1 with staple intact  HEENT: atraumatic, normocephalic, sclera white, conjunctivae pink  NECK: supple  CHEST: no chest tenderness  LUNGS: clear to auscultation bilaterally, no wheezes, rhonchi or rales, normal respiratory effort  CARDIO: S1, S2, RRR without audible murmur  GI: + BS to all quadrants, no tenderness  MUSCULOSKELETAL: + ROM in all joints, no evidence of swelling, pain   EXTREMITIES: no cyanosis, or edema  NEURO: Oriented x3  PSYCHIATRIC: appropriate affect    ASSESSMENT/ PLAN:   1. Epidural hematoma/closed depressed fracture of skull/altered mental status/s/p craniotomy/evacuation of hematoma  Sports related injury with trauma to the head  Developed left hemisphere epidural hematoma with multiple depressed skull fractures  Underwent left craniotomy for evacuation of epidural hematoma, elevation of depressed skull fracture without complications  Had AMS directly after incident that resolved and patient is alert and oriented x 4 today  Still having scalp incisional pain that is rated 5/10 before pain medicine and goes down to 2/10 after medicine  Continue as needed  Fioricet 1 tab every 6 hours as needed for headaches  Oxycodone 5 mg every 6 hours as needed  Tylenol  mg 1-2 tabs every 6 hours as needed  No NSAIDs  Refill sent for  butalbital-acetaminophen-caffeine -40 MG Oral Tab; Take 1 tablet by mouth every 6 (six) hours as needed for Headaches.  Dispense: 5 tablet; Refill: 0  Has incision present to left scalp with staples in place that is D/I and GAYLE  Has drain site present with 1 staple present D/I and  GAYLE  Recommended OP PT-family to schedule  Still has mild swelling of the left side of her face  Recommended  Incentive spirometer 5-10 times per hour WA to prevent postoperative complications  Up walking 5-10 minutes every 30-60 minutes WA to prevent post hospital complications  While sitting do ankle pumps and foot circles  All postop restrictions/recommendations discussed with patient and family  Tolerating an oral diet  Appetite is good/drinking well  Moving bowels/passing gas  Reports gets mild forgetfulness when tired  Neck is stiff throughout the day  Jaw pain present throughout the day  Headache worsens when she is tired and pain to the incision also increases when tired  Repeat CT on 4/19 at 12 PM  Follow-up with TOD MATA on 4/22 at 9:15 AM for follow-up and staple removal  Establish with PCP Dr. Davies on 5/8 at 10:20 AM  All pertinent hospital records, labs, radiology from current hospitalization reviewed    2.  ABLA (acute blood loss anemia)  Stable  Hemoglobin prior to discharge was 11.7  Continue to monitor labs as directed    3. Hypokalemia  Resolved  Potassium at discharge 4.0  Continue to monitor labs as directed      Orders Placed This Encounter    butalbital-acetaminophen-caffeine -40 MG Oral Tab     Sig: Take 1 tablet by mouth every 6 (six) hours as needed for Headaches.     Dispense:  5 tablet     Refill:  0           Medications & Refills for this Visit:  Current Medications[6]  Requested Prescriptions     Signed Prescriptions Disp Refills    butalbital-acetaminophen-caffeine -40 MG Oral Tab 5 tablet 0     Sig: Take 1 tablet by mouth every 6 (six) hours as needed for Headaches.         Health Maintenance:  Health Maintenance   Topic Date Due    Annual Physical  Never done    HPV Vaccines (1 - 3-dose series) Never done    Chlamydia Screening  Never done    Meningococcal B Vaccine (1 of 2 - Standard) Never done    DTaP,Tdap,and Td Vaccines (1 - Tdap) Never done    Pap Smear   Never done    COVID-19 Vaccine (5 - 2024-25 season) 09/01/2024    Influenza Vaccine  Completed    Annual Depression Screening  Completed    Pneumococcal Vaccine: Birth to 50yrs  Aged Out       Chronic Care Management Referral: N/A      Transitional Care Management Certification:  During the visit, I accessed [employer] and/or Care Everywhere and personally reviewed the following for the recent hospitalization: provider notes, consults, summaries, labs and other test results and the pertinent findings are documented below.     Medication Reconciliation:  I am aware of an inpatient discharge within the last 30 days.  The discharge medication list has been reconciled with the patient's current medication list and reviewed by me.  See medication list for additions of new medication, and changes to current doses of medications and discontinued medications.        Discharge Disposition/Plan:     Future Appointments   Date Time Provider Department Center   4/22/2025  9:15 AM Kristen Andre PA [facility]   5/8/2025 10:20 AM Eusebia Davies MD [facility] 3 [facility]      1.  Transitional Care Clinic Visit: Next visit Discharged  2.  PCP Visit: 5/8/2025  3.  Chronic Care Management: N/A     TOD García, 4/15/2025  [facility]  [phone]         [1] No Known Allergies  [2]   Past Medical History:   Anxiety   [3] No past surgical history on file.  [4] No family history on file.  [5]   Social History  Socioeconomic History    Marital status: Single   Tobacco Use    Smoking status: Never    Smokeless tobacco: Never     Social Drivers of Health     Food Insecurity: No Food Insecurity (4/9/2025)    NCSS - Food Insecurity     Worried About Running Out of Food in the Last Year: No     Ran Out of Food in the Last Year: No   Transportation Needs: No Transportation Needs (4/9/2025)    NCSS - Transportation     Lack of Transportation: No   Housing Stability: Not At Risk (4/9/2025)    NCSS -  Housing/Utilities     Has Housing: Yes     Worried About Losing Housing: No     Unable to Get Utilities: No   [6]    butalbital-acetaminophen-caffeine -40 MG Oral Tab Take 1 tablet by mouth every 6 (six) hours as needed for Headaches. 5 tablet 0    oxyCODONE 5 MG Oral Tab Take 1 tablet (5 mg total) by mouth every 6 (six) hours as needed. 20 tablet 0    sertraline 50 MG Oral Tab Take 1.5 tablets (75 mg total) by mouth in the morning.

## 2025-04-16 NOTE — PROGRESS NOTES
TRANSITIONAL CARE CLINIC PHARMACIST MEDICATION RECONCILIATION        Adina Jefferson MRN [id number]    10/2/2000 PCP No primary care provider on file.       Comments: Medication history completed by the Transitional Care Clinic Pharmacist with the patient and parents in the office.     The following medication changes occurred while patient was hospitalized:  Medications Started:   Butalbital-APAP-Caff -40mg tabs - 1 tablet every 6 hours as needed for headaches  Levetiracetam 500mg tabs - 1 tablet two times daily (therapy completed on 4/15/25)  Oxycodone 5mg tabs - 1 tablet every 6 hours as needed    Outpatient Encounter Medications as of 2025   Medication Sig    oxyCODONE 5 MG Oral Tab Take 1 tablet (5 mg total) by mouth every 6 (six) hours as needed.    butalbital-acetaminophen-caffeine -40 MG Oral Tab Take 1 tablet by mouth every 6 (six) hours as needed for Headaches.    sertraline 50 MG Oral Tab Take 1.5 tablets (75 mg total) by mouth in the morning.    Norgestim-Eth Estrad Triphasic 0.18/0.215/0.25 MG-35 MCG Oral Tab  (Patient not taking: Reported on 2025)     Medication Adherence Assessment:   Patient reports finishing the Levetiracetam as prescribed.  Reports alternating the Fioricet and Oxycodone tablets every 4 hours after discharge, but stopped the Fioricet after the morning dose and has been taking the Oxycodone every 6 hours since.  Patient reports that if she is up and about for a long period of time the headaches come back and at its worst the pain is at a 5/10.  Reports having some sharp stabbing-type pain along the suture line periodically, which is probably due to the healing process.    Recommended the patient stop the Oxycodone at this time and try using the Fioricet tablets and alternate every 3-4 hours with Acetaminophen 1000mg.  Discussed if the pain increases to 7-8/10, then she can substitute in the Oxycodone for that dose, and then continue the alternating.  Explained  that the Fioricet tablets are more specific for the headaches, and should be used more for mild to moderate pain (up to 5-6/10) and it will not cause as much constipation.  Patient willing to try the alternating with the Acetaminophen, but has only 3 tablets remaining on the Fioricet.  Pended refill for Fioricet.    Patient reports she has not restarted the birth control tablets.  States she was unsure if she should restart at this time.  Recommended she check with her physician, but at this point they will probably have her restart after her next cycle.  Patient states she understands.    Reviewed all medications in detail with patient including dose, indication, timing of administration, monitoring parameters, and potential side effects of medications.   Patient confirmed understanding.     Thank you,    Xena Asif, PharmD, 4/16/2025, 7:36 AM  Transitional Care Clinic

## 2025-04-16 NOTE — TELEPHONE ENCOUNTER
Message below noted.    Patient to be scheduled with PA for Tuesday 4/22 at 0915. Patient should also move up CT appointment to Monday or Tuesday early morning prior to visit for review.    Routed to  to assist patient with scheduling.

## 2025-04-19 ENCOUNTER — HOSPITAL ENCOUNTER (OUTPATIENT)
Dept: CT IMAGING | Facility: HOSPITAL | Age: 25
Discharge: HOME | End: 2025-04-19
Attending: NURSE PRACTITIONER
Payer: COMMERCIAL

## 2025-04-19 DIAGNOSIS — S06.4XAA EPIDURAL HEMATOMA (HCC): ICD-10-CM

## 2025-04-19 DIAGNOSIS — S06.5XAA ACUTE SUBDURAL HEMATOMA (HCC): ICD-10-CM

## 2025-04-19 PROCEDURE — 70450 CT HEAD/BRAIN W/O DYE: CPT | Performed by: NURSE PRACTITIONER

## 2025-04-22 ENCOUNTER — OFFICE VISIT (OUTPATIENT)
Dept: SURGERY | Facility: CLINIC | Age: 25
End: 2025-04-22
Payer: COMMERCIAL

## 2025-04-22 VITALS
HEART RATE: 98 BPM | BODY MASS INDEX: 19.99 KG/M2 | SYSTOLIC BLOOD PRESSURE: 140 MMHG | HEIGHT: 65 IN | DIASTOLIC BLOOD PRESSURE: 90 MMHG | WEIGHT: 120 LBS

## 2025-04-22 DIAGNOSIS — S02.91XA CLOSED DEPRESSED FRACTURE OF SKULL, INITIAL ENCOUNTER (HCC): ICD-10-CM

## 2025-04-22 DIAGNOSIS — S06.4XAA EPIDURAL HEMATOMA (HCC): Primary | ICD-10-CM

## 2025-04-22 PROCEDURE — 3077F SYST BP >= 140 MM HG: CPT

## 2025-04-22 PROCEDURE — 3080F DIAST BP >= 90 MM HG: CPT

## 2025-04-22 PROCEDURE — 99024 POSTOP FOLLOW-UP VISIT: CPT

## 2025-04-22 PROCEDURE — 3008F BODY MASS INDEX DOCD: CPT

## 2025-04-22 NOTE — PATIENT INSTRUCTIONS
Refill policies:    Allow 2-3 business days for refills; controlled substances may take longer.  Contact your pharmacy at least 5 days prior to running out of medication and have them send an electronic request or submit request through the “request refill” option in your [employer] account.  Refills are not addressed on weekends; covering physicians do not authorize routine medications on weekends.  No narcotics or controlled substances are refilled after noon on Fridays or by on call physicians.  By law, narcotics must be electronically prescribed.  A 30 day supply with no refills is the maximum allowed.  If your prescription is due for a refill, you may be due for a follow up appointment.  To best provide you care, patients receiving routine medications need to be seen at least once a year.  Patients receiving narcotic/controlled substance medications need to be seen at least once every 3 months.  In the event that your preferred pharmacy does not have the requested medication in stock (e.g. Backordered), it is your responsibility to find another pharmacy that has the requested medication available.  We will gladly send a new prescription to that pharmacy at your request.    Scheduling Tests:    If your physician has ordered radiology tests such as MRI or CT scans, please contact Central Scheduling at [phone] right away to schedule the test.  Once scheduled, the [facility] Centralized Referral Team will work with your insurance carrier to obtain pre-certification or prior authorization.  Depending on your insurance carrier, approval may take 3-10 days.  It is highly recommended patients assure they have received an authorization before having a test performed.  If test is done without insurance authorization, patient may be responsible for the entire amount billed.      Precertification and Prior Authorizations:  If your physician has recommended that you have a procedure or additional testing performed the [facility]  Centralized Referral Team will contact your insurance carrier to obtain pre-certification or prior authorization.    You are strongly encouraged to contact your insurance carrier to verify that your procedure/test has been approved and is a COVERED benefit.  Although the [facility] Centralized Referral Team does its due diligence, the insurance carrier gives the disclaimer that \"Although the procedure is authorized, this does not guarantee payment.\"    Ultimately the patient is responsible for payment.   Thank you for your understanding in this matter.  Paperwork Completion:  If you require FMLA or disability paperwork for your recovery, please make sure to either drop it off or have it faxed to our office at [phone]. Be sure the form has your name and date of birth on it.  The form will be faxed to our Forms Department and they will complete it for you.  There is a 25$ fee for all forms that need to be filled out.  Please be aware there is a 10-14 day turnaround time.  You will need to sign a release of information (GENARO) form if your paperwork does not come with one.  You may call the Forms Department with any questions at [phone].  Their fax number is [phone].

## 2025-04-22 NOTE — PROGRESS NOTES
[facility]   Progress Note       Patient: Adina Jefferson  Medical Record Number: [id number]  YOB: 2000  PCP: No primary care provider on file.  Reason for visit:   Chief Complaint   Patient presents with    Post-Op      craniotomy         HISTORY OF PRESENT ILLNESS:  Adina Jefferson is a(n) 24 year old female who presents for postoperative follow-up.    Patient underwent emergent evacuation of epidural hematoma and elevation of depressed skull fracture on 4/8/2025 with Dr. Herrera.    Patient reports she has been gradually feeling better.  Her headaches have been decreasing in frequency and she denies any headaches for the last couple of days.  At present, she denies any sensitivity to light, dizziness, visual abnormalities, weakness or other neurologic symptoms.  She denies any fevers or chills or incisional concerns.  She has been taking Tylenol as needed for pain.  She is pleased with her progress thus far.    PAST MEDICAL HISTORY:  Past Medical History[1]    PAST SURGICAL HISTORY:  Past Surgical History[2]    FAMILY HISTORY:  family history is not on file.    SOCIAL HISTORY:   reports that she has never smoked. She has never used smokeless tobacco.    ALLERGIES:  Allergies[3]    MEDICATIONS:  Prescriptions Prior to Admission[4]  Current Hospital Medications[5]    REVIEW OF SYSTEMS:  Comprehensive Review of Systems obtained, and is negative other than that mentioned in the History of Present Illness.      PHYSICAL EXAMINATION:  VITAL SIGNS: /90 (BP Location: Right arm, Patient Position: Sitting, Cuff Size: adult)   Pulse 98   Ht 65\"   Wt 120 lb (54.4 kg)   LMP  (LMP Unknown)   BMI 19.97 kg/m²   GENERAL:  Patient is a 24 year old female in no acute distress.  HEENT:  Normocephalic, atraumatic    NEUROLOGICAL:  This patient is bright and awake, answers all questions appropriately.  Speech fluent. Comprehension intact.   PERRLA +3 brisk,  EOMI.  Face is  symmetrical. Tongue is midline.  Uvula and palate elevate symmetrically. Shoulder shrug normal bilaterally. Remaining CN's GI.  Sensation to light touch is intact bilaterally.  Motor: No Drift.     Upper extremity strength:      Deltoid  Biceps  Triceps     Finger abduction     Right 5 5 5 5 5     Left 5 5 5 5 5      Lower extremity strength:      Iliospoas  Hamstrings  Quads  D-flexion  P-flexion     Right 5 5 5 5 5     Left 5 5 5 5 5      Incision: Cranial incision appears to be healing well.  There is no erythema, edema or drainage.  Staples removed in office without complication.  Drain stitch removed in office without complication.  Wound appeared to be in satisfactory condition with some overlying scab upon removal of staples.    DIAGNOSTIC DATA:      IMAGING:  CT BRAIN OR HEAD (CPT=70450)  Result Date: 4/19/2025  PROCEDURE:  CT BRAIN OR HEAD (67852)  COMPARISON:  [facility] , CT, CT BRAIN OR HEAD (47342), 4/08/2025, 4:32 PM.  [facility] , CT, CTA BRAIN + CTA CAROTIDS (CPT=70496/08628), 4/08/2025, 10:15 PM.  INDICATIONS:  S06.4XAA Epidural hematoma (HCC) S06.5XAA Acute subdural hematoma (HCC)  TECHNIQUE:  Noncontrast CT scanning is performed through the brain. Dose reduction techniques were used. Dose information is transmitted to the ACR (American College of Radiology) NRDR (National Radiology Data Registry) which includes the Dose Index Registry.  PATIENT STATED HISTORY: (As transcribed by Technologist)  Previous brain injury. Interval exam.    FINDINGS: Sequelae of left pterional craniotomy is again noted.  Minimal extra-axial density adjacent to the craniotomy site measures approximately 3 mm.  Expected evolution of post craniotomy changes is noted.  Pneumocephalus is no longer identified.  No new abnormality in the brain parenchyma.  Ventricles and sulci are appropriate for the patient's age.  No mass effect.  Visualized portions of paranasal sinuses are unremarkable.  Visualized portions of mastoid air cells  are unremarkable.  Visualized portions of orbits are unremarkable.  IMPRESSION: The SPECT lead evolution of previously noted left epidural hemorrhage along with postoperative left pterional craniotomy changes.    LOCATION:  [facility]   Dictated by (CST): Gerber Gamboa MD on 4/19/2025 at 12:43 PM     Finalized by (CST): Gerber Gamboa MD on 4/19/2025 at 12:45 PM           ASSESSMENT:  Adina Jefferson is a very pleasant 24 year old female who presents for postoperative follow-up s/p evacuation of epidural hematoma and elevation of depressed skull fracture on 4/8/2025 with Dr. Herrera.  Patient reports feeling more towards her baseline each day and denies any new neurologic complaints.  Head CT from 4/19 with expected postoperative changes, minimal extra-axial fluid collection about 3 mm.  Staples were removed in office today, incision appears to be healing well.    Plan:    1. Medication: None prescribed  2. Imaging: Reviewed recent head CT from 4/19 with findings as above.  Ordered repeat head CT to be completed in 3-4 weeks just prior to follow-up visit.  3. Activity: Reviewed general activity restrictions, gradual return to normal activities as tolerated, do not recommend lifting any greater than 15-20 pounds, go slow with return to normal activities.  Okay to drive as long as she is not taking narcotic pain medications.  Continue gentle shampoo on hair, do not apply any hair products such as hair or oils directly to scalp, do not recommend applying any product directly to the incision site.  Continue to monitor incision for any erythema, edema or drainage.   4. Follow up in 3-4 weeks with new head CT or call or follow-up sooner with any new concerning signs or symptoms        Kristen Andre M.S., CHRISSY  [facility]  [address], [address]  [place] [address]  [phone]  4/22/2025 9:37 AM    Dragon speech recognition software was used to prepare this note. If a word or phrase is  confusing, it is likely due to a failure of recognition. Please contact me with any questions or clarifications.          [1]   Past Medical History:   Anxiety   [2]   Past Surgical History:  Procedure Laterality Date    Other surgical history  04/21/2025    LEFT CRANIOTOMY FOR EVACUATION OF EPIDURAL HEMATOMA, ELEVATION OF DEPRESSED SKULL FRACTURE   [3] No Known Allergies  [4] (Not in a hospital admission)  [5]   No current facility-administered medications for this visit.

## 2025-04-22 NOTE — PROGRESS NOTES
Established patient:  Reason for follow up:   LEFT CRANIOTOMY FOR EVACUATION OF EPIDURAL HEMATOMA, ELEVATION OF DEPRESSED SKULL FRACTURE Left       Numeric Rating Scale:        Pain at Present:  2/10       Distribution of Pain:    left    Most recent treatments for Current Pain Condition:   Surgery  Response to treatment: some relief    New imaging or testing since your last office visit:  CT BRAIN OR HEAD

## 2025-05-13 ENCOUNTER — HOSPITAL ENCOUNTER (OUTPATIENT)
Dept: CT IMAGING | Facility: HOSPITAL | Age: 25
Discharge: HOME | End: 2025-05-13
Payer: COMMERCIAL

## 2025-05-13 DIAGNOSIS — S06.4XAA EPIDURAL HEMATOMA (HCC): ICD-10-CM

## 2025-05-13 DIAGNOSIS — S02.91XA CLOSED DEPRESSED FRACTURE OF SKULL, INITIAL ENCOUNTER (HCC): ICD-10-CM

## 2025-05-13 PROCEDURE — 70450 CT HEAD/BRAIN W/O DYE: CPT

## 2025-05-20 NOTE — PROGRESS NOTES
[facility]   Progress Note       Patient: Adina Jefferson  Medical Record Number: [id number]  YOB: 2000  PCP: None Pcp  Reason for visit: Postoperative follow-up      HISTORY OF PRESENT ILLNESS:  Adina Jefferson is a(n) 24 year old female who presents for postoperative follow-up    S/p: 4/8/2025: Evacuation of epidural hematoma and elevation of depressible fracture with Dr. Herrera    Patient reports she has been doing very well since her last office visit.  She continues to have intermittent headaches.  She feels her focus and attention is improving.  She is now back to working out and tolerating this without difficulty.  She denies any new neurologic complaints    PAST MEDICAL HISTORY:  Past Medical History[1]    PAST SURGICAL HISTORY:  Past Surgical History[2]    FAMILY HISTORY:  family history is not on file.    SOCIAL HISTORY:   reports that she has never smoked. She has never used smokeless tobacco.    ALLERGIES:  Allergies[3]    MEDICATIONS:  Prescriptions Prior to Admission[4]  Current Hospital Medications[5]    REVIEW OF SYSTEMS:  Comprehensive Review of Systems obtained, and is negative other than that mentioned in the History of Present Illness.      PHYSICAL EXAMINATION:  VITAL SIGNS: LMP  (LMP Unknown)   GENERAL:  Patient is a 24 year old female in no acute distress.  HEENT:  Normocephalic, atraumatic    NEUROLOGICAL: This patient is bright and awake, answers all questions appropriately.  Speech fluent, comprehension intact.  Face grossly symmetric.  EOMI.  Moves all extremities spontaneously and equally to gravity.  Cranial incision appears to be well-healed.  Tails from retained suture were removed in the office.  There is no erythema, edema or drainage of the wound.    DIAGNOSTIC DATA:      IMAGING:  CT BRAIN OR HEAD (CPT=70450)  Result Date: 5/14/2025  CONCLUSION:  No acute intracranial abnormality is identified.  There are postoperative changes from a prior  left pterional craniotomy with subjacent dural thickening.    LOCATION:  [facility]   Dictated by (CST): Stromberg, LeRoy, MD on 5/14/2025 at 7:37 AM     Finalized by (CST): Stromberg, LeRoy, MD on 5/14/2025 at 7:42 AM          ASSESSMENT:  Adina Jefferson is a very pleasant 24 year old female who presents for postoperative follow-up s/p evacuation of epidural hematoma and elevation of depressed skull fracture on 4/8/2025 with Dr. Herrera.  She continues to improve since surgery.  Recent head CT without any residual hematoma.    Plan:    1.  Patient may follow-up with neurosurgery on an as needed basis.  2.  No need for any further imaging, given clinical stability and radiographic stability.  3.  Okay to return to exercise at the gym including weightlifting.        Patient seen and assessed with Dr. Herrera.  Plan of care was discussed with the patient and her parents who accompanied her to the office visit today.  All questions were answered.     Kristen Andre M.[place]., [place] [address]  [phone]  5/20/2025 6:56 PM    Dragon speech recognition software was used to prepare this note. If a word or phrase is confusing, it is likely due to a failure of recognition. Please contact me with any questions or clarifications.            [1]   Past Medical History:   Anxiety   [2]   Past Surgical History:  Procedure Laterality Date    Other surgical history  04/21/2025    LEFT CRANIOTOMY FOR EVACUATION OF EPIDURAL HEMATOMA, ELEVATION OF DEPRESSED SKULL FRACTURE   [3] No Known Allergies  [4] (Not in a hospital admission)  [5]   No current facility-administered medications for this visit.

## 2025-05-21 ENCOUNTER — OFFICE VISIT (OUTPATIENT)
Dept: SURGERY | Facility: CLINIC | Age: 25
End: 2025-05-21
Payer: COMMERCIAL

## 2025-05-21 VITALS
DIASTOLIC BLOOD PRESSURE: 75 MMHG | HEIGHT: 65 IN | SYSTOLIC BLOOD PRESSURE: 109 MMHG | HEART RATE: 90 BPM | BODY MASS INDEX: 19.99 KG/M2 | WEIGHT: 120 LBS

## 2025-05-21 DIAGNOSIS — S06.4XAA EPIDURAL HEMATOMA (HCC): Primary | ICD-10-CM

## 2025-05-21 DIAGNOSIS — S02.91XA CLOSED DEPRESSED FRACTURE OF SKULL, INITIAL ENCOUNTER (HCC): ICD-10-CM

## 2025-05-21 DIAGNOSIS — Z98.890 S/P CRANIOTOMY: ICD-10-CM

## 2025-05-21 PROCEDURE — 3074F SYST BP LT 130 MM HG: CPT

## 2025-05-21 PROCEDURE — 99024 POSTOP FOLLOW-UP VISIT: CPT

## 2025-05-21 PROCEDURE — 3008F BODY MASS INDEX DOCD: CPT

## 2025-05-21 PROCEDURE — 3078F DIAST BP <80 MM HG: CPT

## 2025-05-21 NOTE — PROGRESS NOTES
Patient is s/p emergent evacuation of epidural hematoma and elevation of depressed skull fracture on 4/8/2025 with Dr. Herrera.     Patient has been getting headaches consistently, almost daily. Pain is 3/10 when they occur. The pain is mainly left-sided and patient describes a 'pressure' feeling in the head. Denies vision changes. Denies lightheadedness, dizziness, syncope, N/T, weakness. Overall she is doing well.     Meds: Extra strength tylenol prn

## 2025-06-04 ENCOUNTER — OFFICE VISIT (OUTPATIENT)
Dept: FAMILY MEDICINE CLINIC | Facility: CLINIC | Age: 25
End: 2025-06-04
Payer: COMMERCIAL

## 2025-06-04 VITALS
HEART RATE: 100 BPM | SYSTOLIC BLOOD PRESSURE: 98 MMHG | BODY MASS INDEX: 20.72 KG/M2 | HEIGHT: 65 IN | DIASTOLIC BLOOD PRESSURE: 60 MMHG | WEIGHT: 124.38 LBS | OXYGEN SATURATION: 98 %

## 2025-06-04 DIAGNOSIS — Z00.00 ENCOUNTER FOR ROUTINE HISTORY AND PHYSICAL EXAMINATION: Primary | ICD-10-CM

## 2025-06-04 PROBLEM — L70.0 ACNE VULGARIS: Status: ACTIVE | Noted: 2020-09-22

## 2025-06-04 PROCEDURE — 99385 PREV VISIT NEW AGE 18-39: CPT | Performed by: STUDENT IN AN ORGANIZED HEALTH CARE EDUCATION/TRAINING PROGRAM

## 2025-06-04 PROCEDURE — 3078F DIAST BP <80 MM HG: CPT | Performed by: STUDENT IN AN ORGANIZED HEALTH CARE EDUCATION/TRAINING PROGRAM

## 2025-06-04 PROCEDURE — 3074F SYST BP LT 130 MM HG: CPT | Performed by: STUDENT IN AN ORGANIZED HEALTH CARE EDUCATION/TRAINING PROGRAM

## 2025-06-04 PROCEDURE — 3008F BODY MASS INDEX DOCD: CPT | Performed by: STUDENT IN AN ORGANIZED HEALTH CARE EDUCATION/TRAINING PROGRAM

## 2025-06-04 NOTE — PROGRESS NOTES
The following individual(s) verbally consented to be recorded using ambient AI listening technology and understand that they can each withdraw their consent to this listening technology at any point by asking the clinician to turn off or pause the recording:    Patient name: Adina Spain Li

## 2025-06-04 NOTE — PROGRESS NOTES
[facility] [place]    CC: yearly exam     HPI: Adina Jefferson is 24 year old female here for a yearly physical.  History of Present Illness  Leading to  epidural hematoma and now s/p craniotomy. She is establishing care today.     She experienced a brain trauma requiring surgical intervention for a hematoma. Her final surgical follow-up was two weeks ago, with no further follow-up needed unless new symptoms arise. She currently has headaches every couple of days, which are gradually improving.    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 Her workplace is accommodating if she needs to take breaks or go home.    She is taking Zoloft, managed by her psychiatrist, and a birth control pill, managed by her gynecologist. She had a normal Pap smear in November and plans to follow up with her gynecologist within the next few months.    She lives with her parents and sister and has two dogs. She does not use alcohol, marijuana, tobacco, or drugs and limits her caffeine intake.    She experiences some health anxiety following her brain trauma, particularly related to her headaches, but this has improved after her last surgical follow-up appointment. No current stress, anxiety, or depression.       Health Habits:  Tobacco use:  reports that she has never smoked. She has never used smokeless tobacco.  Alcohol Use. denies  Dental Exam. UTD  Eye Exam. UTD       GYN:  Birth Control. Not discussed      PMH:  Problem List[1]  Past Medical History[2]      Healthcare Maintenance:  Health Maintenance   Topic Date Due    Annual Physical  Never done    Chlamydia Screening  Never done    Meningococcal B Vaccine (1 of 2 - Standard) Never done    COVID-19 Vaccine (5 - 2024-25 season) 09/01/2024    Pap Smear  11/05/2027    DTaP,Tdap,and Td Vaccines (8 - Td or Tdap) 07/19/2032    Influenza Vaccine  Completed    Annual Depression Screening   Completed    Pneumococcal Vaccine: Birth to 50yrs  Completed    HPV Vaccines  Completed          Last Physical 06/04/25   Problem List[3]  Past Medical History[4]      SH: reviewed     FH: reviewed     Social History     Social History Narrative    Not on file        ROS: full 10 point review of systems done and otherwise negative.         PE:  Vital Signs    06/04/25 0914   BP: 98/60   Pulse: 100     Wt Readings from Last 3 Encounters:   06/04/25 124 lb 6.4 oz (56.4 kg)   05/21/25 120 lb (54.4 kg)   04/22/25 120 lb (54.4 kg)     Body mass index is 20.7 kg/m².     General: Comfortable, pleasant, NAD, appears stated age  HEENT.  NC/AT, no conjunctival injection, TMs clear, oropharynx without erythema or exudate, neck supple, no LAD or thyromegaly  CV.  RRR, no m/r/g  Pulm. CTAB, no W/R/R, comfortable work of breathing, speaks in full sentences  Abdomen. Soft, nt, nd  .  defer  Extremities.  2+ DP pulses, no edema  Skin.  No concerning moles       No visits with results within 4 Week(s) from this visit.   Latest known visit with results is:   Admission on 04/08/2025, Discharged on 04/13/2025   Component Date Value Ref Range Status    Hold Lavender 04/08/2025 Auto Resulted   Final    Hold Lt Green 04/08/2025 Auto Resulted   Final    Hold Blue 04/08/2025 Auto Resulted   Final    Hold Gold 04/08/2025 Auto Resulted   Final    PTT 04/08/2025 21.9 (L)  23.0 - 36.0 seconds Final    PT 04/08/2025 13.8  11.6 - 14.8 seconds Final    INR 04/08/2025 1.05  0.80 - 1.20 Final    ABO BLOOD TYPE 04/08/2025 O   Final    RH BLOOD TYPE 04/08/2025 Negative   Final    Antibody Screen 04/08/2025 Negative   Final    Glucose 04/08/2025 132 (H)  70 - 99 mg/dL Final    Sodium 04/08/2025 140  136 - 145 mmol/L Final    Potassium 04/08/2025 3.3 (L)  3.5 - 5.1 mmol/L Final    Chloride 04/08/2025 106  98 - 112 mmol/L Final    CO2 04/08/2025 25.0  21.0 - 32.0 mmol/L Final    Anion Gap 04/08/2025 9  0 - 18 mmol/L Final    BUN 04/08/2025 15  9 - 23  mg/dL Final    Creatinine 04/08/2025 1.04 (H)  0.55 - 1.02 mg/dL Final    Calcium, Total 04/08/2025 9.5  8.7 - 10.6 mg/dL Final    Calculated Osmolality 04/08/2025 293  275 - 295 mOsm/kg Final    eGFR-Cr 04/08/2025 78  >=60 mL/min/1.73m2 Final    AST 04/08/2025 24  <34 U/L Final    ALT 04/08/2025 10  10 - 49 U/L Final    Alkaline Phosphatase 04/08/2025 59  52 - 144 U/L Final    Bilirubin, Total 04/08/2025 0.3  0.3 - 1.2 mg/dL Final    Total Protein 04/08/2025 7.1  5.7 - 8.2 g/dL Final    Albumin 04/08/2025 4.6  3.2 - 4.8 g/dL Final    Globulin  04/08/2025 2.5  2.0 - 3.5 g/dL Final    A/G Ratio 04/08/2025 1.8  1.0 - 2.0 Final    WBC 04/08/2025 12.6 (H)  4.0 - 11.0 x10(3) uL Final    RBC 04/08/2025 3.94  3.80 - 5.30 x10(6)uL Final    HGB 04/08/2025 12.6  12.0 - 16.0 g/dL Final    HCT 04/08/2025 37.0  35.0 - 48.0 % Final    PLT 04/08/2025 259.0  150.0 - 450.0 10(3)uL Final    MCV 04/08/2025 93.9  80.0 - 100.0 fL Final    MCH 04/08/2025 32.0  26.0 - 34.0 pg Final    MCHC 04/08/2025 34.1  31.0 - 37.0 g/dL Final    RDW 04/08/2025 11.0  % Final    Neutrophil Absolute Prelim 04/08/2025 7.30  1.50 - 7.70 x10 (3) uL Final    Neutrophil Absolute 04/08/2025 7.30  1.50 - 7.70 x10(3) uL Final    Lymphocyte Absolute 04/08/2025 3.97  1.00 - 4.00 x10(3) uL Final    Monocyte Absolute 04/08/2025 0.76  0.10 - 1.00 x10(3) uL Final    Eosinophil Absolute 04/08/2025 0.43  0.00 - 0.70 x10(3) uL Final    Basophil Absolute 04/08/2025 0.09  0.00 - 0.20 x10(3) uL Final    Immature Granulocyte Absolute 04/08/2025 0.07  0.00 - 1.00 x10(3) uL Final    Neutrophil % 04/08/2025 57.8  % Final    Lymphocyte % 04/08/2025 31.5  % Final    Monocyte % 04/08/2025 6.0  % Final    Eosinophil % 04/08/2025 3.4  % Final    Basophil % 04/08/2025 0.7  % Final    Immature Granulocyte % 04/08/2025 0.6  % Final    WBC 04/08/2025 22.7 (H)  4.0 - 11.0 x10(3) uL Final    RBC 04/08/2025 3.48 (L)  3.80 - 5.30 x10(6)uL Final    HGB 04/08/2025 11.2 (L)  12.0 - 16.0 g/dL  Final    HCT 04/08/2025 32.2 (L)  35.0 - 48.0 % Final    PLT 04/08/2025 209.0  150.0 - 450.0 10(3)uL Final    MCV 04/08/2025 92.5  80.0 - 100.0 fL Final    MCH 04/08/2025 32.2  26.0 - 34.0 pg Final    MCHC 04/08/2025 34.8  31.0 - 37.0 g/dL Final    RDW 04/08/2025 11.2  % Final    Neutrophil Absolute Prelim 04/08/2025 20.88 (H)  1.50 - 7.70 x10 (3) uL Final    Neutrophil Absolute 04/08/2025 20.88 (H)  1.50 - 7.70 x10(3) uL Final    Lymphocyte Absolute 04/08/2025 1.02  1.00 - 4.00 x10(3) uL Final    Monocyte Absolute 04/08/2025 0.59  0.10 - 1.00 x10(3) uL Final    Eosinophil Absolute 04/08/2025 0.00  0.00 - 0.70 x10(3) uL Final    Basophil Absolute 04/08/2025 0.04  0.00 - 0.20 x10(3) uL Final    Immature Granulocyte Absolute 04/08/2025 0.15  0.00 - 1.00 x10(3) uL Final    Neutrophil % 04/08/2025 92.0  % Final    Lymphocyte % 04/08/2025 4.5  % Final    Monocyte % 04/08/2025 2.6  % Final    Eosinophil % 04/08/2025 0.0  % Final    Basophil % 04/08/2025 0.2  % Final    Immature Granulocyte % 04/08/2025 0.7  % Final    Glucose 04/08/2025 150 (H)  70 - 99 mg/dL Final    Sodium 04/08/2025 139  136 - 145 mmol/L Final    Potassium 04/08/2025 4.3  3.5 - 5.1 mmol/L Final    Chloride 04/08/2025 109  98 - 112 mmol/L Final    CO2 04/08/2025 21.0  21.0 - 32.0 mmol/L Final    Anion Gap 04/08/2025 9  0 - 18 mmol/L Final    BUN 04/08/2025 13  9 - 23 mg/dL Final    Creatinine 04/08/2025 0.84  0.55 - 1.02 mg/dL Final    Calcium, Total 04/08/2025 8.2 (L)  8.7 - 10.6 mg/dL Final    Calculated Osmolality 04/08/2025 291  275 - 295 mOsm/kg Final    eGFR-Cr 04/08/2025 101  >=60 mL/min/1.73m2 Final    POC Glucose 04/08/2025 142 (H)  70 - 99 mg/dL Final    Glucose 04/09/2025 127 (H)  70 - 99 mg/dL Final    Sodium 04/09/2025 141  136 - 145 mmol/L Final    Potassium 04/09/2025 4.3  3.5 - 5.1 mmol/L Final    Chloride 04/09/2025 109  98 - 112 mmol/L Final    CO2 04/09/2025 24.0  21.0 - 32.0 mmol/L Final    Anion Gap 04/09/2025 8  0 - 18 mmol/L Final     BUN 04/09/2025 11  9 - 23 mg/dL Final    Creatinine 04/09/2025 0.76  0.55 - 1.02 mg/dL Final    Calcium, Total 04/09/2025 7.7 (L)  8.7 - 10.6 mg/dL Final    Calculated Osmolality 04/09/2025 293  275 - 295 mOsm/kg Final    eGFR-Cr 04/09/2025 114  >=60 mL/min/1.73m2 Final    WBC 04/09/2025 16.3 (H)  4.0 - 11.0 x10(3) uL Final    RBC 04/09/2025 3.28 (L)  3.80 - 5.30 x10(6)uL Final    HGB 04/09/2025 10.4 (L)  12.0 - 16.0 g/dL Final    HCT 04/09/2025 31.1 (L)  35.0 - 48.0 % Final    PLT 04/09/2025 182.0  150.0 - 450.0 10(3)uL Final    MCV 04/09/2025 94.8  80.0 - 100.0 fL Final    MCH 04/09/2025 31.7  26.0 - 34.0 pg Final    MCHC 04/09/2025 33.4  31.0 - 37.0 g/dL Final    RDW 04/09/2025 11.0  % Final    Neutrophil Absolute Prelim 04/09/2025 15.17 (H)  1.50 - 7.70 x10 (3) uL Final    Neutrophil Absolute 04/09/2025 15.17 (H)  1.50 - 7.70 x10(3) uL Final    Lymphocyte Absolute 04/09/2025 0.66 (L)  1.00 - 4.00 x10(3) uL Final    Monocyte Absolute 04/09/2025 0.38  0.10 - 1.00 x10(3) uL Final    Eosinophil Absolute 04/09/2025 0.00  0.00 - 0.70 x10(3) uL Final    Basophil Absolute 04/09/2025 0.02  0.00 - 0.20 x10(3) uL Final    Immature Granulocyte Absolute 04/09/2025 0.07  0.00 - 1.00 x10(3) uL Final    Neutrophil % 04/09/2025 93.2  % Final    Lymphocyte % 04/09/2025 4.0  % Final    Monocyte % 04/09/2025 2.3  % Final    Eosinophil % 04/09/2025 0.0  % Final    Basophil % 04/09/2025 0.1  % Final    Immature Granulocyte % 04/09/2025 0.4  % Final    MRSA Screen By PCR 04/09/2025 Negative  Negative Final    POC Glucose 04/09/2025 148 (H)  70 - 99 mg/dL Final    POC Glucose 04/09/2025 130 (H)  70 - 99 mg/dL Final    POC Glucose 04/09/2025 124 (H)  70 - 99 mg/dL Final    ISTAT Sodium 04/08/2025 140  136 - 145 mmol/L Final    ISTAT Potassium 04/08/2025 3.6  3.6 - 5.1 mmol/L Final    ISTAT Ionized Calcium 04/08/2025 1.02 (L)  1.12 - 1.32 mmol/L Final    ISTAT Hematocrit 04/08/2025 26 (L)  34 - 50 % Final    ISTAT Glucose 04/08/2025  118 (H)  70 - 99 mg/dL Final    ISTAT Blood Gas pH 04/08/2025 7.39   Final    ISTAT Blood Gas PCO2 04/08/2025 27.0  mmHg Final    ISTAT Blood Gas PO2 04/08/2025 458  mmHg Final    ISTAT Blood Gas TCO2 04/08/2025 17 (L)  22 - 32 mmol/L Final    ISTAT Blood Gas HCO3 04/08/2025 16.4  mEq/L Final    ISTAT Blood Gas O2 Saturation 04/08/2025 100  % Final    ISTAT Blood Gas Base Excess 04/08/2025 -9.0  mmol/L Final    POC Glucose 04/09/2025 108 (H)  70 - 99 mg/dL Final    WBC 04/10/2025 13.5 (H)  4.0 - 11.0 x10(3) uL Final    RBC 04/10/2025 3.22 (L)  3.80 - 5.30 x10(6)uL Final    HGB 04/10/2025 10.2 (L)  12.0 - 16.0 g/dL Final    HCT 04/10/2025 30.6 (L)  35.0 - 48.0 % Final    PLT 04/10/2025 173.0  150.0 - 450.0 10(3)uL Final    MCV 04/10/2025 95.0  80.0 - 100.0 fL Final    MCH 04/10/2025 31.7  26.0 - 34.0 pg Final    MCHC 04/10/2025 33.3  31.0 - 37.0 g/dL Final    RDW 04/10/2025 11.3  % Final    Glucose 04/10/2025 90  70 - 99 mg/dL Final    Sodium 04/10/2025 143  136 - 145 mmol/L Final    Potassium 04/10/2025 4.0  3.5 - 5.1 mmol/L Final    Chloride 04/10/2025 108  98 - 112 mmol/L Final    CO2 04/10/2025 28.0  21.0 - 32.0 mmol/L Final    Anion Gap 04/10/2025 7  0 - 18 mmol/L Final    BUN 04/10/2025 8 (L)  9 - 23 mg/dL Final    Creatinine 04/10/2025 0.79  0.55 - 1.02 mg/dL Final    Calcium, Total 04/10/2025 8.7  8.7 - 10.6 mg/dL Final    Calculated Osmolality 04/10/2025 294  275 - 295 mOsm/kg Final    eGFR-Cr 04/10/2025 108  >=60 mL/min/1.73m2 Final    POC Glucose 04/10/2025 85  70 - 99 mg/dL Final    WBC 04/13/2025 8.1  4.0 - 11.0 x10(3) uL Final    RBC 04/13/2025 3.72 (L)  3.80 - 5.30 x10(6)uL Final    HGB 04/13/2025 11.7 (L)  12.0 - 16.0 g/dL Final    HCT 04/13/2025 34.2 (L)  35.0 - 48.0 % Final    PLT 04/13/2025 245.0  150.0 - 450.0 10(3)uL Final    MCV 04/13/2025 91.9  80.0 - 100.0 fL Final    MCH 04/13/2025 31.5  26.0 - 34.0 pg Final    MCHC 04/13/2025 34.2  31.0 - 37.0 g/dL Final    RDW 04/13/2025 11.3  % Final         A/P: Adina Jefferson is 24 year old yo female here for complete physical.  1. Healthcare Maintenance. Discussed eye exam, dentist visit q6 months, sunscreen, exercise at least 5x/week, 30 minutes daily, and limiting caffeine intake.  Assessment & Plan  Traumatic Brain Injury with Post-Surgical Headaches  Post-surgical headaches improving, no further surgical follow-up needed unless new symptoms arise. Cleared for full-time work and gradual exercise resumption.  - Monitor headache frequency and severity.  - Gradually resume exercise as tolerated.  - Self-massage and stretch jaw muscles.    Health Anxiety  Anxiety improved after reassurance from surgical follow-up.  - Continue management with psychiatrist for Zoloft.    General Health Maintenance  Up to date with Pap smear and dental visits. Discussed routine blood work for baseline cholesterol and other parameters.  - Order baseline cholesterol and other routine blood work.  - Advise fasting for blood work if possible.  - Ensure Pap smear results are documented.       Encounter Medications[5]        Side effects, risks and benefits of medications were explained.  The patient or responsible adult showed the ability to learn, asked appropriate questions.  There were no barriers to learning and they verbalized understanding of the treatment plan.     Medication list provided to patient and /or family member.     This note was created in part by Dragon recognition software.  Please excuse errors.                 [1]   Patient Active Problem List  Diagnosis    Epidural hematoma (HCC)    Closed depressed fracture of skull, initial encounter (HCC)    Altered mental status, unspecified altered mental status type    Traumatic left-sided intracerebral hemorrhage with loss of consciousness of 30 minutes or less (HCC)    Leukocytosis    Closed depressed fracture of skull with routine healing    S/P craniotomy    S/P evacuation of hematoma    Hypokalemia    ABLA (acute blood  loss anemia)    Acne vulgaris   [2]   Past Medical History:   Anxiety   [3]   Patient Active Problem List  Diagnosis    Epidural hematoma (HCC)    Closed depressed fracture of skull, initial encounter ([facility])    Altered mental status, unspecified altered mental status type    Traumatic left-sided intracerebral hemorrhage with loss of consciousness of 30 minutes or less ([facility])    Leukocytosis    Closed depressed fracture of skull with routine healing    S/P craniotomy    S/P evacuation of hematoma    Hypokalemia    ABLA (acute blood loss anemia)    Acne vulgaris   [4]   Past Medical History:   Anxiety   [5]   Outpatient Encounter Medications as of 6/4/2025   Medication Sig Dispense Refill    sertraline 50 MG Oral Tab Take 1.5 tablets (75 mg total) by mouth in the morning.      Norgestim-Eth Estrad Triphasic 0.18/0.215/0.25 MG-35 MCG Oral Tab       butalbital-acetaminophen-caffeine -40 MG Oral Tab Take 1 tablet by mouth every 6 (six) hours as needed for Headaches. (Patient not taking: Reported on 6/4/2025) 5 tablet 0    oxyCODONE 5 MG Oral Tab Take 1 tablet (5 mg total) by mouth every 6 (six) hours as needed. (Patient not taking: Reported on 6/4/2025) 20 tablet 0     No facility-administered encounter medications on file as of 6/4/2025.

## 2025-08-04 ENCOUNTER — PATIENT MESSAGE (OUTPATIENT)
Dept: FAMILY MEDICINE CLINIC | Facility: CLINIC | Age: 25
End: 2025-08-04

## 2025-08-04 DIAGNOSIS — Z00.00 LABORATORY EXAM ORDERED AS PART OF ROUTINE GENERAL MEDICAL EXAMINATION: Primary | ICD-10-CM

## 2025-08-29 ENCOUNTER — LAB ENCOUNTER (OUTPATIENT)
Dept: LAB | Age: 25
End: 2025-08-29
Attending: STUDENT IN AN ORGANIZED HEALTH CARE EDUCATION/TRAINING PROGRAM

## 2025-08-29 DIAGNOSIS — Z00.00 LABORATORY EXAM ORDERED AS PART OF ROUTINE GENERAL MEDICAL EXAMINATION: ICD-10-CM

## 2025-08-29 LAB
ALBUMIN SERPL-MCNC: 4.5 G/DL (ref 3.2–4.8)
ALBUMIN/GLOB SERPL: 1.7 (ref 1–2)
ALP LIVER SERPL-CCNC: 66 U/L (ref 52–144)
ALT SERPL-CCNC: 8 U/L (ref 10–49)
ANION GAP SERPL CALC-SCNC: 12 MMOL/L (ref 0–18)
AST SERPL-CCNC: 17 U/L (ref ?–34)
BASOPHILS # BLD AUTO: 0.04 X10(3) UL (ref 0–0.2)
BASOPHILS NFR BLD AUTO: 0.6 %
BILIRUB SERPL-MCNC: 0.6 MG/DL (ref 0.3–1.2)
BUN BLD-MCNC: 13 MG/DL (ref 9–23)
CALCIUM BLD-MCNC: 9.8 MG/DL (ref 8.7–10.6)
CHLORIDE SERPL-SCNC: 104 MMOL/L (ref 98–112)
CHOLEST SERPL-MCNC: 166 MG/DL (ref ?–200)
CO2 SERPL-SCNC: 25 MMOL/L (ref 21–32)
CREAT BLD-MCNC: 0.94 MG/DL (ref 0.55–1.02)
EGFRCR SERPLBLD CKD-EPI 2021: 88 ML/MIN/1.73M2 (ref 60–?)
EOSINOPHIL # BLD AUTO: 0.4 X10(3) UL (ref 0–0.7)
EOSINOPHIL NFR BLD AUTO: 6.1 %
ERYTHROCYTE [DISTWIDTH] IN BLOOD BY AUTOMATED COUNT: 11.6 %
FASTING PATIENT LIPID ANSWER: YES
FASTING STATUS PATIENT QL REPORTED: YES
GLOBULIN PLAS-MCNC: 2.7 G/DL (ref 2–3.5)
GLUCOSE BLD-MCNC: 83 MG/DL (ref 70–99)
HCT VFR BLD AUTO: 39.7 % (ref 35–48)
HDLC SERPL-MCNC: 68 MG/DL (ref 40–59)
HGB BLD-MCNC: 13.2 G/DL (ref 12–16)
IMM GRANULOCYTES # BLD AUTO: 0.01 X10(3) UL (ref 0–1)
IMM GRANULOCYTES NFR BLD: 0.2 %
LDLC SERPL CALC-MCNC: 83 MG/DL (ref ?–100)
LYMPHOCYTES # BLD AUTO: 2.11 X10(3) UL (ref 1–4)
LYMPHOCYTES NFR BLD AUTO: 31.9 %
MCH RBC QN AUTO: 31.8 PG (ref 26–34)
MCHC RBC AUTO-ENTMCNC: 33.2 G/DL (ref 31–37)
MCV RBC AUTO: 95.7 FL (ref 80–100)
MONOCYTES # BLD AUTO: 0.58 X10(3) UL (ref 0.1–1)
MONOCYTES NFR BLD AUTO: 8.8 %
NEUTROPHILS # BLD AUTO: 3.47 X10 (3) UL (ref 1.5–7.7)
NEUTROPHILS # BLD AUTO: 3.47 X10(3) UL (ref 1.5–7.7)
NEUTROPHILS NFR BLD AUTO: 52.4 %
NONHDLC SERPL-MCNC: 98 MG/DL (ref ?–130)
OSMOLALITY SERPL CALC.SUM OF ELEC: 291 MOSM/KG (ref 275–295)
PLATELET # BLD AUTO: 228 10(3)UL (ref 150–450)
POTASSIUM SERPL-SCNC: 4.4 MMOL/L (ref 3.5–5.1)
PROT SERPL-MCNC: 7.2 G/DL (ref 5.7–8.2)
RBC # BLD AUTO: 4.15 X10(6)UL (ref 3.8–5.3)
SODIUM SERPL-SCNC: 141 MMOL/L (ref 136–145)
TRIGL SERPL-MCNC: 79 MG/DL (ref 30–149)
VLDLC SERPL CALC-MCNC: 12 MG/DL (ref 0–30)
WBC # BLD AUTO: 6.6 X10(3) UL (ref 4–11)

## 2025-08-29 PROCEDURE — 85025 COMPLETE CBC W/AUTO DIFF WBC: CPT

## 2025-08-29 PROCEDURE — 80053 COMPREHEN METABOLIC PANEL: CPT

## 2025-08-29 PROCEDURE — 36415 COLL VENOUS BLD VENIPUNCTURE: CPT

## 2025-08-29 PROCEDURE — 80061 LIPID PANEL: CPT

## (undated) DEVICE — CATHETER IV 14GA L5.25IN ANGIOCATH STR FEP

## (undated) DEVICE — SOLUTION IRRIG 1000ML 0.9% NACL USP BTL

## (undated) DEVICE — 2.3MM SPIRAL ROUTER

## (undated) DEVICE — PAD,NON-ADHERENT,3X8,STERILE,LF,1/PK: Brand: MEDLINE

## (undated) DEVICE — JELLY,LUBE,STERILE,FLIP TOP,TUBE,2-OZ: Brand: MEDLINE

## (undated) DEVICE — GAUZE,SPONGE,USP,4"X4",12PLY,STRL,10/PK: Brand: MEDLINE INDUSTRIES, INC.

## (undated) DEVICE — SYRINGE,EAR/ULCER, 2 OZ, STERILE: Brand: MEDLINE

## (undated) DEVICE — SLEEVE COMPR MD KNEE LEN SGL USE KENDALL SCD

## (undated) DEVICE — DRAIN SURG W7MMXL20CM SIL 3/4 PERF FLAT

## (undated) DEVICE — 4-PORT MANIFOLD: Brand: NEPTUNE 2

## (undated) DEVICE — CRANIOTOMY CDS: Brand: MEDLINE INDUSTRIES, INC.

## (undated) DEVICE — CODMAN® SURGICAL PATTIES 1/2" X 1/2" (1.27CM X 1.27CM): Brand: CODMAN®

## (undated) DEVICE — INSULATED BLADE ELECTRODE: Brand: EDGE

## (undated) DEVICE — SOLUTION DURAPREP 26ML APPLICATOR

## (undated) DEVICE — AEGIS 1" DISK 4MM HOLE, PEEL OPEN: Brand: MEDLINE

## (undated) DEVICE — 3.0MM PRECISION NEURO (MATCH HEAD)

## (undated) DEVICE — SCREW, AXS, SELF-DRILLING
Type: IMPLANTABLE DEVICE | Site: HEAD | Status: NON-FUNCTIONAL
Brand: UNIVERSAL NEURO 3
Removed: 2025-04-08

## (undated) DEVICE — DISPOSABLE BIPOLAR FORCEPS 4" (10.2CM) JEWELERS, STRAIGHT 0.4MM TIP AND 12 FT. (3.6M) CABLE: Brand: KIRWAN

## (undated) DEVICE — KIT EVAC 400CC DIA1/8IN H PAT 12.5IN 3 SPR

## (undated) DEVICE — 3M™ IOBAN™ 2 ANTIMICROBIAL INCISE DRAPE 6650EZ: Brand: IOBAN™ 2

## (undated) DEVICE — PROXIMATE RH ROTATING HEAD SKIN STAPLERS (35 WIDE) CONTAINS 35 STAINLESS STEEL STAPLES: Brand: PROXIMATE

## (undated) DEVICE — BANDAGE ADH 1INX3IN NAT FAB N ADH PD CURAD

## (undated) DEVICE — BANDAGE,GAUZE,BULKEE II,4.5"X4.1YD,STRL: Brand: MEDLINE

## (undated) DEVICE — COVER PRB 5X96IN W/ GEL ULTRACOVER

## (undated) DEVICE — GLOVE SUR 8 SENSICARE PI PIP CRM PWD F

## (undated) DEVICE — SPONGE,NEURO,0.5"X1.5",XR,STRL,LF,10/PK: Brand: MEDLINE

## (undated) DEVICE — MARKER SKIN PREP-RESISTANT ST: Brand: MEDLINE INDUSTRIES, INC.

## (undated) DEVICE — MAYFIELD® DISPOSABLE ADULT SKULL PIN (PLASTIC BASE): Brand: MAYFIELD®

## (undated) DEVICE — RANEY SCALP CLIP STERILE: Brand: AESCULAP

## (undated) DEVICE — EVACUATOR SUR 100CC SIL BLB WND

## (undated) DEVICE — GLOVE SUR 8 BIOGEL PI ULTRATOUCH S PIP BLU

## (undated) DEVICE — SUT COAT VCRL+ 2-0 18IN CP-2 ABSRB UD ANTIBAC

## (undated) DEVICE — PAD SACRAL SPAN AID

## (undated) DEVICE — GLOVE SUR 7.5 SENSICARE PI PIP CRM PWD F

## (undated) DEVICE — SYRINGE MED 10ML LL TIP W/O SFTY DISP

## (undated) DEVICE — BATTERY PACK FOR VARISPEED: Brand: STRYKER VARISPEED

## (undated) NOTE — LETTER
[address]  [address]  Authorization for Surgical Operation and Procedure     Date:___________                                                                                                         Time:__________  I hereby authorize Surgeon(s):  Xiang Herrera MD, my physician and his/her assistants (if applicable), which may include medical students, residents, and/or fellows, to perform the following surgical operation/ procedure and administer such anesthesia as may be determined necessary by my physician:  Operation/Procedure name (s) Procedure(s):  LEFT CRANIOTOMY FOR EVACUATION OF EPIDURAL HEMATOMA on Adina Jefferson   2.   I recognize that during the surgical operation/procedure, unforeseen conditions may necessitate additional or different procedures than those listed above.  I, therefore, further authorize and request that the above-named surgeon, assistants, or designees perform such procedures as are, in their judgment, necessary and desirable.    3.   My surgeon/physician has discussed prior to my surgery the potential benefits, risks and side effects of this procedure; the likelihood of achieving goals; and potential problems that might occur during recuperation.  They also discussed reasonable alternatives to the procedure, including risks, benefits, and side effects related to the alternatives and risks related to not receiving this procedure.  I have had all my questions answered and I acknowledge that no guarantee has been made as to the result that may be obtained.    4.   Should the need arise during my operation/procedure, which includes change of level of care prior to discharge, I also consent to the administration of blood and/or blood products.  Further, I understand that despite careful testing and screening of blood or blood products by collecting agencies, I may still be subject to ill effects as a result of receiving a blood transfusion  and/or blood products.  The following are some, but not all, of the potential risks that can occur: fever and allergic reactions, hemolytic reactions, transmission of diseases such as Hepatitis, AIDS and Cytomegalovirus (CMV) and fluid overload.  In the event that I wish to have an autologous transfusion of my own blood, or a directed donor transfusion, I will discuss this with my physician.  Check only if Refusing Blood or Blood Products  I understand refusal of blood or blood products as deemed necessary by my physician may have serious consequences to my condition to include possible death. I hereby assume responsibility for my refusal and release the hospital, its personnel, and my physicians from any responsibility for the consequences of my refusal.          o  Refuse      5.   I authorize the use of any specimen, organs, tissues, body parts or foreign objects that may be removed from my body during the operation/procedure for diagnosis, research or teaching purposes and their subsequent disposal by hospital authorities.  I also authorize the release of specimen test results and/or written reports to my treating physician on the hospital medical staff or other referring or consulting physicians involved in my care, at the discretion of the Pathologist or my treating physician.    6.   I consent to the photographing or videotaping of the operations or procedures to be performed, including appropriate portions of my body for medical, scientific, or educational purposes, provided my identity is not revealed by the pictures or by descriptive texts accompanying them.  If the procedure has been photographed/videotaped, the surgeon will obtain the original picture, image, videotape or CD.  The hospital will not be responsible for storage, release or maintenance of the picture, image, tape or CD.    7.   I consent to the presence of a  or observers in the operating room as deemed necessary by my  physician or their designees.    8.   I recognize that in the event my procedure results in extended X-Ray/fluoroscopy time, I may develop a skin reaction.    9. If I have a Do Not Attempt Resuscitation (DNAR) order in place, that status will be suspended while in the operating room, procedural suite, and during the recovery period unless otherwise explicitly stated by me (or a person authorized to consent on my behalf). The surgeon or my attending physician will determine when the applicable recovery period ends for purposes of reinstating the DNAR order.  10. Patients having a sterilization procedure: I understand that if the procedure is successful the results will be permanent and it will therefore be impossible for me to inseminate, conceive, or bear children.  I also understand that the procedure is intended to result in sterility, although the result has not been guaranteed.   11. I acknowledge that my physician has explained sedation/analgesia administration to me including the risk and benefits I consent to the administration of sedation/analgesia as may be necessary or desirable in the judgment of my physician.    I CERTIFY THAT I HAVE READ AND FULLY UNDERSTAND THE ABOVE CONSENT TO OPERATION and/or OTHER PROCEDURE.    _________________________________________  __________________________________  Signature of Patient     Signature of Responsible Person         ___________________________________         Printed Name of Responsible Person           _________________________________                 Relationship to Patient  _________________________________________  ______________________________  Signature of Witness          Date  Time      Patient Name: Adina Jefferson     : 10/2/2000                 Printed: 2025     Medical Record #: [id number]                     Page [address] of [address] [place] [address]  [id number]    Consent for  Anesthesia    I, Adina Jefferson agree to be cared for by an anesthesiologist, who is specially trained to monitor me and give me medicine to put me to sleep or keep me comfortable during my procedure    I understand that my anesthesiologist is not an employee or agent of [facility] [employer]. He or she works for [employer].    As the patient asking for anesthesia services, I agree to:  Allow the anesthesiologist (anesthesia doctor) to give me medicine and do additional procedures as necessary. Some examples are: Starting or using an “IV” to give me medicine, fluids or blood during my procedure, and having a breathing tube placed to help me breathe when I’m asleep (intubation). In the event that my heart stops working properly, I understand that my anesthesiologist will make every effort to sustain my life, unless otherwise directed by [facility] Do Not Resuscitate documents.  Tell my anesthesia doctor before my procedure:  If I am pregnant.  The last time that I ate or drank.  All of the medicines I take (including prescriptions, herbal supplements, and pills I can buy without a prescription (including street drugs/illegal medications). Failure to inform my anesthesiologist about these medicines may increase my risk of anesthetic complications.  If I am allergic to anything or have had a reaction to anesthesia before.  I understand how the anesthesia medicine will help me (benefits).  I understand that with any type of anesthesia medicine there are risks:  The most common risks are: nausea, vomiting, sore throat, muscle soreness, damage to my eyes, mouth, or teeth (from breathing tube placement).  Rare risks include: remembering what happened during my procedure, allergic reactions to medications, injury to my airway, heart, lungs, vision, nerves, or muscles and in extremely rare instances death.  My doctor has explained to me other choices available to me for my care  (alternatives).  Pregnant Patients (“epidural”):  I understand that the risks of having an epidural (medicine given into my back to help control pain during labor), include itching, low blood pressure, difficulty urinating, headache or slowing of the baby’s heart. Very rare risks include infection, bleeding, seizure, irregular heart rhythms and nerve injury.  Regional Anesthesia (“spinal”, “epidural”, & “nerve blocks”):  I understand that rare but potential complications include headache, bleeding, infection, seizure, irregular heart rhythms, and nerve injury.    I can change my mind about having anesthesia services at any time before I get the medicine.    _____________________________________________________________________________  Patient (or Representative) Signature/Relationship to Patient  Date   Time    _____________________________________________________________________________   Name (if used)    Language/Organization   Time    _____________________________________________________________________________  Anesthesiologist Signature     Date   Time  I have discussed the procedure and information above with the patient (or patient’s representative) and answered their questions. The patient or their representative has agreed to have anesthesia services.    _____________________________________________________________________________  Witness        Date   Time  I have verified that the signature is that of the patient or patient’s representative, and that it was signed before the procedure  Patient Name: dAina Jefferson     : 10/2/2000                 Printed: 2025     Medical Record #: [id number]                     Page 2 of 2

## (undated) NOTE — Clinical Note
Pancho Singh, pt feeling better since discharge.  Has scheduled visit with you.  Thank you, Gemini